# Patient Record
Sex: FEMALE | Race: WHITE | Employment: OTHER | URBAN - METROPOLITAN AREA
[De-identification: names, ages, dates, MRNs, and addresses within clinical notes are randomized per-mention and may not be internally consistent; named-entity substitution may affect disease eponyms.]

---

## 2019-07-10 ENCOUNTER — OFFICE VISIT (OUTPATIENT)
Dept: FAMILY MEDICINE CLINIC | Age: 34
End: 2019-07-10
Payer: COMMERCIAL

## 2019-07-10 VITALS
HEART RATE: 76 BPM | SYSTOLIC BLOOD PRESSURE: 110 MMHG | WEIGHT: 118 LBS | BODY MASS INDEX: 18.48 KG/M2 | DIASTOLIC BLOOD PRESSURE: 80 MMHG

## 2019-07-10 DIAGNOSIS — F43.22 ADJUSTMENT DISORDER WITH ANXIOUS MOOD: Primary | ICD-10-CM

## 2019-07-10 PROCEDURE — 99213 OFFICE O/P EST LOW 20 MIN: CPT | Performed by: FAMILY MEDICINE

## 2019-07-10 PROCEDURE — G8427 DOCREV CUR MEDS BY ELIG CLIN: HCPCS | Performed by: FAMILY MEDICINE

## 2019-07-10 PROCEDURE — 4004F PT TOBACCO SCREEN RCVD TLK: CPT | Performed by: FAMILY MEDICINE

## 2019-07-10 PROCEDURE — G8419 CALC BMI OUT NRM PARAM NOF/U: HCPCS | Performed by: FAMILY MEDICINE

## 2020-01-22 ENCOUNTER — OFFICE VISIT (OUTPATIENT)
Dept: PRIMARY CARE CLINIC | Age: 35
End: 2020-01-22
Payer: COMMERCIAL

## 2020-01-22 LAB
BILIRUBIN, POC: NORMAL
BLOOD URINE, POC: NORMAL
CLARITY, POC: CLEAR
COLOR, POC: YELLOW
GLUCOSE URINE, POC: NORMAL
KETONES, POC: NORMAL
LEUKOCYTE EST, POC: NORMAL
NITRITE, POC: NORMAL
PH, POC: 6
PROTEIN, POC: NORMAL
SPECIFIC GRAVITY, POC: 1.02
UROBILINOGEN, POC: 0.2

## 2020-01-22 PROCEDURE — 93000 ELECTROCARDIOGRAM COMPLETE: CPT | Performed by: FAMILY MEDICINE

## 2020-01-22 PROCEDURE — 4004F PT TOBACCO SCREEN RCVD TLK: CPT | Performed by: FAMILY MEDICINE

## 2020-01-22 PROCEDURE — G8484 FLU IMMUNIZE NO ADMIN: HCPCS | Performed by: FAMILY MEDICINE

## 2020-01-22 PROCEDURE — 81002 URINALYSIS NONAUTO W/O SCOPE: CPT | Performed by: FAMILY MEDICINE

## 2020-01-22 PROCEDURE — 99214 OFFICE O/P EST MOD 30 MIN: CPT | Performed by: FAMILY MEDICINE

## 2020-01-22 PROCEDURE — G8419 CALC BMI OUT NRM PARAM NOF/U: HCPCS | Performed by: FAMILY MEDICINE

## 2020-01-22 PROCEDURE — G8428 CUR MEDS NOT DOCUMENT: HCPCS | Performed by: FAMILY MEDICINE

## 2020-01-22 NOTE — PROGRESS NOTES
Subjective:      Patient ID: Mckayla Mccracken is a 29 y.o. female. HPI11/23 - had crampy RLQ abd pain after eating some lettuce that had been recalled for E.coli. + loose stool, no BRBPR, + N and anorexia. No fever, chills. Later felt some\"mass\" in the RLQ, took laxatives and eventually passed stool. Pain now is random, in RUQ or RLQ. Will have up to 10 episodes/ day, brief. Deep inhalation will cause more pain    Drinks 2 cups coffee per day  Review of Systems    Objective:   Physical Exam  Constitutional:       Appearance: Normal appearance. HENT:      Head: Normocephalic and atraumatic. Mouth/Throat:      Mouth: Mucous membranes are moist.      Dentition: Normal dentition. Pharynx: Oropharynx is clear. Neck:      Musculoskeletal: Normal range of motion. Thyroid: No thyroid mass or thyromegaly. Vascular: No carotid bruit. Cardiovascular:      Rate and Rhythm: Normal rate. Rhythm irregular. Pulmonary:      Effort: Pulmonary effort is normal.      Breath sounds: Normal breath sounds. Abdominal:      General: Abdomen is flat. Bowel sounds are normal.      Palpations: Abdomen is soft. There is no hepatomegaly or splenomegaly. Tenderness: There is tenderness in the right upper quadrant and right lower quadrant. There is rebound. There is no right CVA tenderness or left CVA tenderness. Positive signs include McBurney's sign. Lymphadenopathy:      Cervical: No cervical adenopathy. Neurological:      Mental Status: She is alert. EKG shows ventricular couplets    Assessment:      Right lower quadrant and right upper quadrant abdominal pain.   Ovarian cyst rupture and/or lymphadenitis and/or smoldering appendicitis and/or biliary colic without the gallbladder      Plan:    Ct abd and pelvis with contrast  Holter Monitor  CBC, Renal , ESR and CRP  Stop caffeine        Michael Emerson MD

## 2020-01-27 DIAGNOSIS — I49.9 IRREGULAR HEART BEATS: ICD-10-CM

## 2020-01-27 DIAGNOSIS — R10.11 RUQ ABDOMINAL PAIN: ICD-10-CM

## 2020-01-27 DIAGNOSIS — R10.31 RLQ ABDOMINAL PAIN: ICD-10-CM

## 2020-01-27 DIAGNOSIS — I49.3 FREQUENT UNIFOCAL PVCS: ICD-10-CM

## 2020-01-27 LAB
ANION GAP SERPL CALCULATED.3IONS-SCNC: 15 MMOL/L (ref 3–16)
BASOPHILS ABSOLUTE: 0.1 K/UL (ref 0–0.2)
BASOPHILS RELATIVE PERCENT: 1.3 %
BUN BLDV-MCNC: 9 MG/DL (ref 7–20)
C-REACTIVE PROTEIN: <0.3 MG/L (ref 0–5.1)
CALCIUM SERPL-MCNC: 9.2 MG/DL (ref 8.3–10.6)
CHLORIDE BLD-SCNC: 101 MMOL/L (ref 99–110)
CO2: 23 MMOL/L (ref 21–32)
CREAT SERPL-MCNC: 0.6 MG/DL (ref 0.6–1.1)
EOSINOPHILS ABSOLUTE: 0.5 K/UL (ref 0–0.6)
EOSINOPHILS RELATIVE PERCENT: 9.5 %
GFR AFRICAN AMERICAN: >60
GFR NON-AFRICAN AMERICAN: >60
GLUCOSE BLD-MCNC: 99 MG/DL (ref 70–99)
HCT VFR BLD CALC: 39.2 % (ref 36–48)
HEMOGLOBIN: 13.5 G/DL (ref 12–16)
LYMPHOCYTES ABSOLUTE: 1.3 K/UL (ref 1–5.1)
LYMPHOCYTES RELATIVE PERCENT: 27.7 %
MCH RBC QN AUTO: 33.9 PG (ref 26–34)
MCHC RBC AUTO-ENTMCNC: 34.4 G/DL (ref 31–36)
MCV RBC AUTO: 98.7 FL (ref 80–100)
MONOCYTES ABSOLUTE: 0.4 K/UL (ref 0–1.3)
MONOCYTES RELATIVE PERCENT: 8.6 %
NEUTROPHILS ABSOLUTE: 2.6 K/UL (ref 1.7–7.7)
NEUTROPHILS RELATIVE PERCENT: 52.9 %
PDW BLD-RTO: 12.7 % (ref 12.4–15.4)
PLATELET # BLD: 200 K/UL (ref 135–450)
PMV BLD AUTO: 10.1 FL (ref 5–10.5)
POTASSIUM SERPL-SCNC: 4.2 MMOL/L (ref 3.5–5.1)
RBC # BLD: 3.97 M/UL (ref 4–5.2)
SEDIMENTATION RATE, ERYTHROCYTE: 6 MM/HR (ref 0–20)
SODIUM BLD-SCNC: 139 MMOL/L (ref 136–145)
WBC # BLD: 4.8 K/UL (ref 4–11)

## 2020-02-06 ENCOUNTER — HOSPITAL ENCOUNTER (OUTPATIENT)
Dept: NEUROLOGY | Age: 35
Discharge: HOME | End: 2020-02-06
Payer: COMMERCIAL

## 2020-02-06 ENCOUNTER — HOSPITAL ENCOUNTER (OUTPATIENT)
Dept: CT IMAGING | Age: 35
Discharge: HOME | End: 2020-02-06
Payer: COMMERCIAL

## 2020-02-06 PROCEDURE — 93225 XTRNL ECG REC<48 HRS REC: CPT

## 2020-02-06 PROCEDURE — 6360000004 HC RX CONTRAST MEDICATION: Performed by: FAMILY MEDICINE

## 2020-02-06 PROCEDURE — 74177 CT ABD & PELVIS W/CONTRAST: CPT

## 2020-02-06 PROCEDURE — 93226 XTRNL ECG REC<48 HR SCAN A/R: CPT

## 2020-02-06 RX ADMIN — IOPAMIDOL 75 ML: 755 INJECTION, SOLUTION INTRAVENOUS at 12:57

## 2020-02-06 RX ADMIN — IOHEXOL 50 ML: 240 INJECTION, SOLUTION INTRATHECAL; INTRAVASCULAR; INTRAVENOUS; ORAL at 12:57

## 2020-02-10 LAB
ACQUISITION DURATION: NORMAL S
AVERAGE HEART RATE: 72 BPM
EKG DIAGNOSIS: NORMAL
FASTEST SUPRAVENTRICULAR RATE: 205 BPM
FASTEST VENTRICULAR RATE: 180 BPM
HOLTER MAX HEART RATE: 119 BPM
HOOKUP DATE: NORMAL
HOOKUP TIME: NORMAL
LONGEST SUPRAVENTRICULAR RATE: 156 BPM
LONGEST VE RUN RATE: 153 BPM
MAX HEART RATE TIME/DATE: NORMAL
MIN HEART RATE TIME/DATE: NORMAL
MIN HEART RATE: 47 BPM
NUMBER OF FASTEST SUPRAVENTRICULAR BEATS: 3
NUMBER OF FASTEST VENTRICULAR BEATS: 3
NUMBER OF LONGEST SUPRAVENTRICULAR BEATS: 7
NUMBER OF LONGEST VENTRICULAR BEATS: 4
NUMBER OF QRS COMPLEXES: NORMAL
NUMBER OF SUPRAVENTRICULAR BEATS IN RUNS: 66
NUMBER OF SUPRAVENTRICULAR COUPLETS: 24
NUMBER OF SUPRAVENTRICULAR ECTOPICS: 403
NUMBER OF SUPRAVENTRICULAR ISOLATED BEATS: 288
NUMBER OF SUPRAVENTRICULAR RUNS: 17
NUMBER OF VENTRICULAR BEATS IN RUNS: 620
NUMBER OF VENTRICULAR BIGEMINAL CYCLES: 1129
NUMBER OF VENTRICULAR COUPLETS: 1701
NUMBER OF VENTRICULAR ECTOPICS: 7393
NUMBER OF VENTRICULAR ISOLATED BEATS: 3370
NUMBER OF VENTRICULAR RUNS: 206

## 2020-02-11 ENCOUNTER — TELEPHONE (OUTPATIENT)
Dept: PRIMARY CARE CLINIC | Age: 35
End: 2020-02-11

## 2020-02-11 NOTE — TELEPHONE ENCOUNTER
I hope you are able to give up caffeine. Despite that you had about 7300 of the irregular type of beats that do not pump any blood. About half of those were isolated by themselves but others were in couplets, triplets, and there was one 4 beat run that occurred at 2:51 AM on the Friday morning. This is important because during these premature ventricular contractions no blood is being pumped to your brain or anywhere else. Also, if a 24-hour window was able to capture a 4 beat run its possible that your heart may be having even longer episodes that would only be seen with a longer Holter monitor. Because of these irregular beats I would like to refer you to a cardiologist, I am concerned about how frequent they are and the fact that they are grouped together. Dr. Ricardo Landa comes to this office and can see you here. You can reach him at   [phone].

## 2020-02-13 NOTE — PROGRESS NOTES
Provider   Multiple Vitamins-Minerals (HAIR SKIN AND NAILS FORMULA PO) Take by mouth   Yes Historical Provider, MD   Aspirin-Acetaminophen-Caffeine (EXCEDRIN MIGRAINE PO) Take 1-2 tablets by mouth as needed. Yes Historical Provider, MD        Review of Systems   Constitutional: Negative for activity change, appetite change, diaphoresis, fatigue, fever and unexpected weight change. HENT: Negative for congestion, facial swelling, mouth sores and nosebleeds. Eyes: Negative for discharge and visual disturbance. Respiratory: Negative for cough, chest tightness, shortness of breath and wheezing. Cardiovascular: Negative for chest pain, palpitations and leg swelling. Gastrointestinal: Negative for abdominal distention, abdominal pain, blood in stool and vomiting. Endocrine: Negative for cold intolerance, heat intolerance and polyuria. Genitourinary: Negative for difficulty urinating, dysuria, frequency and hematuria. Musculoskeletal: Negative for back pain, joint swelling, myalgias and neck pain. Skin: Negative for color change, pallor and rash. Allergic/Immunologic: Negative for immunocompromised state. Neurological: Negative for dizziness, syncope, weakness, light-headedness, numbness and headaches. Hematological: Negative for adenopathy. Does not bruise/bleed easily. Psychiatric/Behavioral: Negative for behavioral problems, confusion, decreased concentration and suicidal ideas. The patient is not nervous/anxious.         Vitals:    02/18/20 1448   BP: 104/60   Pulse: 58    Weight: 123 lb 3.2 oz (55.9 kg)       Vitals:    02/18/20 1448   BP: 104/60   Pulse: 58   Weight: 123 lb 3.2 oz (55.9 kg)       BP Readings from Last 3 Encounters:   02/18/20 104/60   07/10/19 110/80   11/25/16 112/72       Wt Readings from Last 3 Encounters:   02/18/20 123 lb 3.2 oz (55.9 kg)   07/10/19 118 lb (53.5 kg)   11/25/16 123 lb (55.8 kg)       Physical Exam  Constitutional:       General: She is not in acute to not utilize these agents. All questions and concerns were addressed to the patient/family. Alternatives to my treatment were discussed. The note was completed using EMR. Every effort wasmade to ensure accuracy; however, inadvertent computerized transcription errors may be present. Thank you for allowing me to participate in thecare or [facility]  Antoine Ambriz MD, [facility]

## 2020-02-18 ENCOUNTER — OFFICE VISIT (OUTPATIENT)
Dept: CARDIOLOGY CLINIC | Age: 35
End: 2020-02-18
Payer: COMMERCIAL

## 2020-02-18 VITALS
WEIGHT: 123.2 LBS | BODY MASS INDEX: 19.3 KG/M2 | HEART RATE: 58 BPM | DIASTOLIC BLOOD PRESSURE: 60 MMHG | SYSTOLIC BLOOD PRESSURE: 104 MMHG

## 2020-02-18 PROBLEM — R94.31 ABNORMAL HOLTER EXAM: Status: ACTIVE | Noted: 2020-02-18

## 2020-02-18 PROCEDURE — 99204 OFFICE O/P NEW MOD 45 MIN: CPT | Performed by: INTERNAL MEDICINE

## 2020-02-18 PROCEDURE — G8484 FLU IMMUNIZE NO ADMIN: HCPCS | Performed by: INTERNAL MEDICINE

## 2020-02-18 PROCEDURE — G8420 CALC BMI NORM PARAMETERS: HCPCS | Performed by: INTERNAL MEDICINE

## 2020-02-18 PROCEDURE — 1036F TOBACCO NON-USER: CPT | Performed by: INTERNAL MEDICINE

## 2020-02-18 PROCEDURE — G8427 DOCREV CUR MEDS BY ELIG CLIN: HCPCS | Performed by: INTERNAL MEDICINE

## 2020-02-18 ASSESSMENT — ENCOUNTER SYMPTOMS
ABDOMINAL PAIN: 0
WHEEZING: 0
SHORTNESS OF BREATH: 0
ABDOMINAL DISTENTION: 0
FACIAL SWELLING: 0
CHEST TIGHTNESS: 0
VOMITING: 0
BLOOD IN STOOL: 0
COUGH: 0
BACK PAIN: 0
COLOR CHANGE: 0
EYE DISCHARGE: 0

## 2020-02-18 NOTE — ASSESSMENT & PLAN NOTE
Personally reviewed her EKG tracings and Holter tracings. (It also showed EP tracings)  Seems that the arrhythmia 8 been generated in her RV free wall, and it raises a suspicion for ARVD  Plan for cardiac MRI to further assess ARVD. If no evidence of ARVD will start beta-blocker to decrease PVC burden perform a repeat Holter monitor. If her PVC burden does not decrease she will likely need to be referred to EP for ablation.

## 2020-02-19 ENCOUNTER — TELEPHONE (OUTPATIENT)
Dept: CARDIOLOGY CLINIC | Age: 35
End: 2020-02-19

## 2020-02-19 NOTE — TELEPHONE ENCOUNTER
Spoke with patient and let her know to be at [facility] at 6:15 on 2/21/20. Reviewed instructions. Patient verbalized understanding.

## 2020-02-24 ENCOUNTER — TELEPHONE (OUTPATIENT)
Dept: CARDIOLOGY | Age: 35
End: 2020-02-24

## 2020-02-28 ASSESSMENT — ENCOUNTER SYMPTOMS
BACK PAIN: 0
VOMITING: 0
COLOR CHANGE: 0
ABDOMINAL DISTENTION: 0
ABDOMINAL PAIN: 0
FACIAL SWELLING: 0
CHEST TIGHTNESS: 0
EYE DISCHARGE: 0
WHEEZING: 0
COUGH: 0
BLOOD IN STOOL: 0
SHORTNESS OF BREATH: 0

## 2020-02-28 NOTE — PROGRESS NOTES
Provider, MD   Aspirin-Acetaminophen-Caffeine (EXCEDRIN MIGRAINE PO) Take 1-2 tablets by mouth as needed. Yes Historical Provider, MD        Review of Systems   Constitutional: Negative for activity change, appetite change, diaphoresis, fatigue, fever and unexpected weight change. HENT: Negative for congestion, facial swelling, mouth sores and nosebleeds. Eyes: Negative for discharge and visual disturbance. Respiratory: Negative for cough, chest tightness, shortness of breath and wheezing. Cardiovascular: Positive for palpitations. Negative for chest pain and leg swelling. Gastrointestinal: Negative for abdominal distention, abdominal pain, blood in stool and vomiting. Endocrine: Negative for cold intolerance, heat intolerance and polyuria. Genitourinary: Negative for difficulty urinating, dysuria, frequency and hematuria. Musculoskeletal: Negative for back pain, joint swelling, myalgias and neck pain. Skin: Negative for color change, pallor and rash. Allergic/Immunologic: Negative for immunocompromised state. Neurological: Negative for dizziness, syncope, weakness, light-headedness, numbness and headaches. Hematological: Negative for adenopathy. Does not bruise/bleed easily. Psychiatric/Behavioral: Negative for behavioral problems, confusion, decreased concentration and suicidal ideas. The patient is not nervous/anxious. Vitals:    03/03/20 1534   BP: 110/60   Pulse: 68   SpO2: 99%    Weight: 126 lb 9.6 oz (57.4 kg)       Vitals:    03/03/20 1534   BP: 110/60   Pulse: 68   SpO2: 99%   Weight: 126 lb 9.6 oz (57.4 kg)   Height: 5' 7\" (1.702 m)       BP Readings from Last 3 Encounters:   03/03/20 110/60   02/18/20 104/60   07/10/19 110/80       Wt Readings from Last 3 Encounters:   03/03/20 126 lb 9.6 oz (57.4 kg)   02/18/20 123 lb 3.2 oz (55.9 kg)   07/10/19 118 lb (53.5 kg)       Physical Exam  Constitutional:       General: She is not in acute distress.      Appearance: She is well-developed. She is not diaphoretic. HENT:      Head: Normocephalic and atraumatic. Eyes:      Pupils: Pupils are equal, round, and reactive to light. Neck:      Musculoskeletal: Normal range of motion. Thyroid: No thyromegaly. Vascular: No JVD. Cardiovascular:      Rate and Rhythm: Normal rate and regular rhythm. Chest Wall: PMI is not displaced. Heart sounds: Normal heart sounds, S1 normal and S2 normal. No murmur. No friction rub. No gallop. Pulmonary:      Effort: Pulmonary effort is normal. No respiratory distress. Breath sounds: Normal breath sounds. No stridor. No wheezing or rales. Chest:      Chest wall: No tenderness. Abdominal:      General: Bowel sounds are normal. There is no distension. Palpations: Abdomen is soft. Tenderness: There is no abdominal tenderness. There is no guarding or rebound. Musculoskeletal: Normal range of motion. General: No tenderness. Lymphadenopathy:      Cervical: No cervical adenopathy. Skin:     General: Skin is warm and dry. Findings: No erythema or rash. Neurological:      Mental Status: She is alert and oriented to person, place, and time. Coordination: Coordination normal.   Psychiatric:         Behavior: Behavior normal.         Thought Content:  Thought content normal.         Judgment: Judgment normal.         Labs:       Lab Results   Component Value Date    WBC 4.8 01/27/2020    HGB 13.5 01/27/2020    HCT 39.2 01/27/2020    MCV 98.7 01/27/2020     01/27/2020     Lab Results   Component Value Date     01/27/2020    K 4.2 01/27/2020     01/27/2020    CO2 23 01/27/2020    BUN 9 01/27/2020    CREATININE 0.6 01/27/2020    GLUCOSE 99 01/27/2020    CALCIUM 9.2 01/27/2020    PROT 8.1 01/05/2012    LABALBU 4.7 01/05/2012    BILITOT 4.4 (H) 01/05/2012    ALKPHOS 178 (H) 01/05/2012     (H) 01/05/2012     (H) 01/05/2012    LABGLOM >60 01/27/2020    GFRAA >60 01/27/2020 No results found for: CHOL  No results found for: TRIG  No results found for: HDL  No results found for: LDLCHOLESTEROL, LDLCALC  No results found for: LABVLDL, VLDL  No results found for: CHOLHDLRATIO    No results found for: INR, PROTIME    The ASCVD Risk score ([place] Holland, et al., 2013) failed to calculate for the following reasons: The 2013 ASCVD risk score is only valid for ages 36 to 78      Imaging:       Last ECG (if available):  Normal sinus rhythm, couplets. Last Monitor/Holter: 2/6/20  The rhythm was sinus. Average [place] interval 0.16 average QRS duration 0.08. Average daily heart rate 72 ranging from 47 to 119 bpm.2. Occasional premature supraventricular ectopic beats total 403 consistingof 288 isolated PACs, 24 atrial pairs and 17 atrial runs. The longest run was 7 beats HR - 156 bpm at 14:26:20. The fastest run was 3 beats HR - 205 bpmat 23:03:55.3. Very Frequent premature ventricular ectopic beats total 7393 consistingof 3370 isolated PVCs, 1701 ventricular couplets, 205 ventricular tripletsand a ventricular run 4 beats HR - 153 bpm at 02:51:46.4. No symptoms noted. Last Stress (if available):    Last Cath (if available):    Last TTE/PRINCESS(if available):    Last CMR  (if available): 2/21/20  The right ventricle is normal in size with preserved systolic function. Findings do not support Arrhythmogenic RV Cardiomyopathy. There is late gadolinium enhancement of the inferoseptal right ventricular insertion point, a nonspecific finding suggestive of elevated right heart pressure. The left ventricle is moderately dilated. There is low normal systolic function. The left atrium is at the upper limit of normal in size.  There is mild mitral regurgitation. Frequently ectopy is noted. Assessment / Plan:     PVC (premature ventricular contraction)  Previously had abnormal Holter with frequent PVCs including some small runs of nonsustained VT.   Cardiac MRI within normal limits, no findings

## 2020-03-03 ENCOUNTER — OFFICE VISIT (OUTPATIENT)
Dept: CARDIOLOGY CLINIC | Age: 35
End: 2020-03-03
Payer: COMMERCIAL

## 2020-03-03 ENCOUNTER — NURSE ONLY (OUTPATIENT)
Dept: CARDIOLOGY CLINIC | Age: 35
End: 2020-03-03

## 2020-03-03 VITALS
BODY MASS INDEX: 19.87 KG/M2 | SYSTOLIC BLOOD PRESSURE: 110 MMHG | WEIGHT: 126.6 LBS | HEART RATE: 68 BPM | OXYGEN SATURATION: 99 % | DIASTOLIC BLOOD PRESSURE: 60 MMHG | HEIGHT: 67 IN

## 2020-03-03 PROCEDURE — G8420 CALC BMI NORM PARAMETERS: HCPCS | Performed by: INTERNAL MEDICINE

## 2020-03-03 PROCEDURE — 1036F TOBACCO NON-USER: CPT | Performed by: INTERNAL MEDICINE

## 2020-03-03 PROCEDURE — G8484 FLU IMMUNIZE NO ADMIN: HCPCS | Performed by: INTERNAL MEDICINE

## 2020-03-03 PROCEDURE — 99214 OFFICE O/P EST MOD 30 MIN: CPT | Performed by: INTERNAL MEDICINE

## 2020-03-03 PROCEDURE — G8427 DOCREV CUR MEDS BY ELIG CLIN: HCPCS | Performed by: INTERNAL MEDICINE

## 2020-03-03 PROCEDURE — 0296T PR EXT ECG > 48HR TO 21 DAY RCRD W/CONECT INTL RCRD: CPT | Performed by: INTERNAL MEDICINE

## 2020-03-04 PROBLEM — I49.3 PVC (PREMATURE VENTRICULAR CONTRACTION): Status: ACTIVE | Noted: 2020-03-04

## 2020-03-17 PROCEDURE — 0298T PR EXT ECG > 48HR TO 21 DAY REVIEW AND INTERPRETATN: CPT | Performed by: INTERNAL MEDICINE

## 2020-03-19 LAB
CLINICIAN PROVIDED ICD10: NORMAL
COMMENT: NORMAL
HPV DNA: NEGATIVE
Lab: NORMAL
PAP WITH REFLEX HPV: NORMAL
PERFORMED BY:: NORMAL
SPECIMEN ADEQUACY:: NORMAL

## 2020-05-05 ENCOUNTER — OFFICE VISIT (OUTPATIENT)
Dept: CARDIOLOGY CLINIC | Age: 35
End: 2020-05-05
Payer: COMMERCIAL

## 2020-05-05 VITALS
TEMPERATURE: 97.6 F | SYSTOLIC BLOOD PRESSURE: 102 MMHG | HEIGHT: 67 IN | BODY MASS INDEX: 20.09 KG/M2 | WEIGHT: 128 LBS | HEART RATE: 63 BPM | DIASTOLIC BLOOD PRESSURE: 70 MMHG

## 2020-05-05 PROCEDURE — 93000 ELECTROCARDIOGRAM COMPLETE: CPT | Performed by: INTERNAL MEDICINE

## 2020-05-05 PROCEDURE — 99204 OFFICE O/P NEW MOD 45 MIN: CPT | Performed by: INTERNAL MEDICINE

## 2020-05-05 PROCEDURE — 1036F TOBACCO NON-USER: CPT | Performed by: INTERNAL MEDICINE

## 2020-05-05 PROCEDURE — G8427 DOCREV CUR MEDS BY ELIG CLIN: HCPCS | Performed by: INTERNAL MEDICINE

## 2020-05-05 PROCEDURE — G8420 CALC BMI NORM PARAMETERS: HCPCS | Performed by: INTERNAL MEDICINE

## 2020-05-05 RX ORDER — IBUPROFEN 800 MG/1
800 TABLET ORAL EVERY 6 HOURS PRN
COMMUNITY

## 2020-05-05 RX ORDER — FLECAINIDE ACETATE 50 MG/1
50 TABLET ORAL 2 TIMES DAILY
Qty: 60 TABLET | Refills: 5 | Status: SHIPPED | OUTPATIENT
Start: 2020-05-05 | End: 2020-10-26

## 2020-05-08 ENCOUNTER — NURSE ONLY (OUTPATIENT)
Dept: CARDIOLOGY CLINIC | Age: 35
End: 2020-05-08
Payer: COMMERCIAL

## 2020-05-08 PROCEDURE — 93000 ELECTROCARDIOGRAM COMPLETE: CPT | Performed by: INTERNAL MEDICINE

## 2020-07-07 NOTE — PROGRESS NOTES
[address]   Cardiac Consultation    Referring Provider:  Blanca Puentes MD     Chief Complaint   Patient presents with    Other     PVC's     HPI:  Pauline Quiroz is a 29 y.o. female referred by Dr. Brian Ramirez for PVC's. PMH of frequent PVC's. 24 hr Holter (2/6/20) showed SR with average HR 72 () with PVC burden of 7.3%. She was started on Metoprolol on 2/24/20. 48 hr Holter (3/3-3/5/20) showed SR with average HR 65 ( bpm) with PVC burden of 8%. Monitor also showed brief NSVT which appears to induce brief PAT. Cardiac MRI (2/21/20) showed no evidence of ARVC but slight LV dysfunction ( LVEF EF 52%). She states that she has passed out several times, but none recently. Once when she was 6 yrs old playing soccer and the other times were during pregnancy. Flecainide was started in 5/2020 and weaned off BB. Currently on flecainide 50 mg BID. She is here today for 2 month f/u since starting the flecainide. She doesn't feel much different on the flecainide, but she does notice that her pulse is regular when she takes her BP. She states her SBP at home runs about  mmHg and has run low her whole life. She feels great. Denies complaints of palpitations, dizziness, CP, SOB, orthopnea, presyncope, or syncope. 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 She tries to exercise when she can and reports good exercise tolerance. Past Medical History:   has a past medical history of Abnormal Pap smear, Anemia, and Migraines. Surgical History:   has a past surgical history that includes [place] tooth extraction; Cholecystectomy, laparoscopic (1/12/12); and Hysterectomy, vaginal (05/2015). Social History:   reports that she quit smoking about 9 years ago. Her smoking use included cigarettes. She smoked 0.25 packs per day. She has never used smokeless tobacco. She reports current alcohol use.  She reports that she does not use drugs. Family History:  family history includes Arthritis in her father; Asthma in her brother; Cancer in her maternal grandmother; Diabetes in her maternal grandmother; High Cholesterol in her father; [place] / [place] in her mother; Stroke in her maternal grandmother. Home Medications:  Outpatient Encounter Medications as of 7/8/2020   Medication Sig Dispense Refill    ibuprofen (ADVIL;MOTRIN) 800 MG tablet Take 800 mg by mouth every 6 hours as needed for Pain      flecainide (TAMBOCOR) 50 MG tablet Take 1 tablet by mouth 2 times daily 60 tablet 5    Aspirin-Acetaminophen-Caffeine (EXCEDRIN MIGRAINE PO) Take 1-2 tablets by mouth as needed Indications: takes with a coke       [DISCONTINUED] metoprolol tartrate (LOPRESSOR) 25 MG tablet TAKE 1 TABLET BY MOUTH TWO TIMES A DAY  (Patient not taking: Reported on 7/8/2020) 60 tablet 0     No facility-administered encounter medications on file as of 7/8/2020. Allergies:  Adhesive tape     Review of Systems   Constitutional: Negative. HENT: Negative. Eyes: Negative. Respiratory: Negative. Cardiovascular: Negative. Gastrointestinal: Negative. Genitourinary: Negative. Musculoskeletal: Negative. Skin: Negative. Neurological: Negative. Hematological: Negative. Psychiatric/Behavioral: Negative. /60   Pulse 63   Temp 98.1 °F (36.7 °C)   Wt 124 lb 9.6 oz (56.5 kg)   LMP 12/29/2011   BMI 19.52 kg/m²       Objective:  Physical Exam   Constitutional: She is oriented to person, place, and time. She appears well-developed and well-nourished. HENT:   Head: Normocephalic and atraumatic. Eyes: Pupils are equal, round, and reactive to light. Neck: Normal range of motion. Cardiovascular: Normal rate, regular rhythm and normal heart sounds. Pulmonary/Chest: Effort normal and breath sounds normal.   Abdominal: Soft. No tenderness. Musculoskeletal: Normal range of motion. She exhibits no edema. Neurological: She is alert and oriented to person, place, and time. Skin: Skin is warm and dry. Psychiatric: She has a normal mood and affect. EK20  SR 63 with 1 PVC, QRS 97 ms    Cardiac MRI 20  IMPRESSIONS:  The right ventricle is normal in size with preserved systolic function. Findings do not support Arrhythmogenic RV Cardiomyopathy. There is late gadolinium enhancement of the inferoseptal right ventricular insertion point, a nonspecific finding suggestive of elevated right heart pressure. The left ventricle is moderately dilated. There is low normal systolic function. The left atrium is at the upper limit of normal in size.  There is mild mitral regurgitation. Frequently ectopy is noted. Assessment:  1. PVC's- 24 hr Holter in 2020 with PVC burden of 7.3%. 14 day Holter (3/2020) showed 8% PVC after being on metoprolol for 1 week. No difference in symptoms since being on metoprolol. Complains of occasional heart racing. LVEF 52% by MRI. Discussed options of changing medicine vs catheter ablation for treatment of PVC's. She is not interested in an invasive procedure at this time, but may consider it. Her PVCs are of outflow tract origin, probably LVOT. Feels good on flecainide. Plan:  1. Continue flecainide 50 mg BID  2. No limitation in physical activity  3. Follow up in 6 months    IChasidy RN, am scribing for and in the presence of Dr. Jocelyne Staton. 20 4:01 PM  Chasidy Orozco RN    I, Dr. Jocelyne Staton, personally performed the services described in this documentation as scribed by Chasidy Orozco RN in my presence, and it is both accurate and complete.       Jocelyne Staton M.D.

## 2020-07-08 ENCOUNTER — OFFICE VISIT (OUTPATIENT)
Dept: CARDIOLOGY CLINIC | Age: 35
End: 2020-07-08
Payer: COMMERCIAL

## 2020-07-08 VITALS
WEIGHT: 124.6 LBS | SYSTOLIC BLOOD PRESSURE: 103 MMHG | TEMPERATURE: 98.1 F | HEART RATE: 63 BPM | DIASTOLIC BLOOD PRESSURE: 60 MMHG | BODY MASS INDEX: 19.52 KG/M2

## 2020-07-08 PROCEDURE — G8427 DOCREV CUR MEDS BY ELIG CLIN: HCPCS | Performed by: INTERNAL MEDICINE

## 2020-07-08 PROCEDURE — 99214 OFFICE O/P EST MOD 30 MIN: CPT | Performed by: INTERNAL MEDICINE

## 2020-07-08 PROCEDURE — 93000 ELECTROCARDIOGRAM COMPLETE: CPT | Performed by: INTERNAL MEDICINE

## 2020-07-08 PROCEDURE — G8420 CALC BMI NORM PARAMETERS: HCPCS | Performed by: INTERNAL MEDICINE

## 2020-07-08 PROCEDURE — 1036F TOBACCO NON-USER: CPT | Performed by: INTERNAL MEDICINE

## 2020-10-26 RX ORDER — FLECAINIDE ACETATE 50 MG/1
TABLET ORAL
Qty: 60 TABLET | Refills: 5 | Status: SHIPPED | OUTPATIENT
Start: 2020-10-26 | End: 2021-04-29 | Stop reason: SDUPTHER

## 2020-10-26 NOTE — TELEPHONE ENCOUNTER
Requested Prescriptions     Pending Prescriptions Disp Refills    flecainide (TAMBOCOR) 50 MG tablet [Pharmacy Med Name: Flecainide Acetate Oral Tablet 50 MG] 60 tablet 0     Sig: TAKE 1 TABLET BY MOUTH TWO TIMES A DAY          Number: 60    Refills: 5    Last Office Visit: 7/8/2020     Next Office Visit: 1/18/2021     Last Refill: 05/05/2020    Last Labs: 01/27/2020 BMP/CBC/sedimentation rate

## 2021-01-18 ENCOUNTER — OFFICE VISIT (OUTPATIENT)
Dept: CARDIOLOGY CLINIC | Age: 36
End: 2021-01-18
Payer: COMMERCIAL

## 2021-01-18 VITALS
HEIGHT: 67 IN | TEMPERATURE: 98.6 F | OXYGEN SATURATION: 98 % | DIASTOLIC BLOOD PRESSURE: 72 MMHG | WEIGHT: 124.4 LBS | HEART RATE: 69 BPM | BODY MASS INDEX: 19.53 KG/M2 | SYSTOLIC BLOOD PRESSURE: 110 MMHG

## 2021-01-18 DIAGNOSIS — R00.2 PALPITATIONS: ICD-10-CM

## 2021-01-18 DIAGNOSIS — R55 SYNCOPE AND COLLAPSE: ICD-10-CM

## 2021-01-18 DIAGNOSIS — I49.3 PVC (PREMATURE VENTRICULAR CONTRACTION): Primary | ICD-10-CM

## 2021-01-18 PROCEDURE — 99213 OFFICE O/P EST LOW 20 MIN: CPT | Performed by: NURSE PRACTITIONER

## 2021-01-18 PROCEDURE — 93000 ELECTROCARDIOGRAM COMPLETE: CPT | Performed by: NURSE PRACTITIONER

## 2021-01-18 PROCEDURE — G8420 CALC BMI NORM PARAMETERS: HCPCS | Performed by: NURSE PRACTITIONER

## 2021-01-18 PROCEDURE — G8484 FLU IMMUNIZE NO ADMIN: HCPCS | Performed by: NURSE PRACTITIONER

## 2021-01-18 PROCEDURE — G8427 DOCREV CUR MEDS BY ELIG CLIN: HCPCS | Performed by: NURSE PRACTITIONER

## 2021-01-18 PROCEDURE — 1036F TOBACCO NON-USER: CPT | Performed by: NURSE PRACTITIONER

## 2021-01-18 NOTE — PROGRESS NOTES
[facility]   Electrophysiology  Office Visit  Date: 1/18/2021    Chief Complaint   Patient presents with    Palpitations    Loss of Consciousness       Cardiac HX: Dhara Rosales is a 28 y.o. woman with a h/o PVCs and syncope, referred by Dr. Keith Ingram for PVCs, 24-hour Holter monitor (2/6/2020) showed NSR with a min HR 47, average 72, max 119 with a PVC burden of 7.3%, placed on metoprolol 2/24/2020, 48-hour Holter (3/3/2020-3/5/2020) showed NSR with a min HR 48, average 65, max 104 and a PVC burden of 8%, NSVT and PAT. Cardiac MRI (2/21/2020) showed no evidence of ARVC but slight LV dysfunction with an EF of 52%, flecainide started 5/20/2020 and weaned off beta-blocker. Interval History/HPI: Patient is here for follow-up for PVCs and a h/o syncope. She has a longstanding history of PVCs. She was originally placed on metoprolol with no improvement in the amount of PVCs. She was then placed on flecainide with symptomatic improvement. She has not had any episodes of dizziness, lightheadedness or syncope. She denies any chest pain, shortness of breath, PND, orthopnea or lower extremity edema. She is active, stays busy running a  and trying to increase her exercise. Home medications:   Current Outpatient Medications on File Prior to Visit   Medication Sig Dispense Refill    flecainide (TAMBOCOR) 50 MG tablet TAKE 1 TABLET BY MOUTH TWO TIMES A DAY  60 tablet 5    ibuprofen (ADVIL;MOTRIN) 800 MG tablet Take 800 mg by mouth every 6 hours as needed for Pain      Aspirin-Acetaminophen-Caffeine (EXCEDRIN MIGRAINE PO) Take 1-2 tablets by mouth as needed Indications: takes with a coke        No current facility-administered medications on file prior to visit.         Past Medical History:   Diagnosis Date    Abnormal Pap smear     had biopsy negative    Anemia chronic, since childhood    was on iron but stopped taking    Migraines         Past Surgical History:   Procedure Laterality Date  CHOLECYSTECTOMY, LAPAROSCOPIC  1/12/12    LAPAROSCOPIC CHOLECYSTECTOMY WITH INTRAOPERATIVE    HYSTERECTOMY, VAGINAL  05/2015    cervical dysplasia and heavy bleeding    WISDOM TOOTH EXTRACTION         Allergies   Allergen Reactions    Adhesive Tape      Red skin       Social History:  Reviewed. reports that she quit smoking about 10 years ago. Her smoking use included cigarettes. She smoked 0.25 packs per day. She has never used smokeless tobacco. She reports current alcohol use. She reports that she does not use drugs. Family History:  Reviewed. family history includes Arthritis in her father; Asthma in her brother; Cancer in her maternal grandmother; Diabetes in her maternal grandmother; High Cholesterol in her father; [place] / [place] in her mother; Stroke in her maternal grandmother. Review of System:    · Constitutional: No fevers, chills. · Eyes: No visual changes or diplopia. No scleral icterus. · ENT: No Headaches. No mouth sores or sore throat. · Cardiovascular: No for chest pain, No for dyspnea on exertion, No for palpitations or No for loss of consciousness. No cough, hemoptysis, No for pleuritic pain, or phlebitis. · Respiratory: No for cough or wheezing. No hematemesis. · Gastrointestinal: No abdominal pain, blood in stools. · Genitourinary: No dysuria, or hematuria. · Musculoskeletal: No gait disturbance,    · Integumentary: No rash or pruritis. · Neurological: No headache, change in muscle strength, numbness or tingling. · Psychiatric: No anxiety, or depression. · Endocrine: No temperature intolerance. No excessive thirst, fluid intake, or urination. · Hem/Lymph: No abnormal bruising or bleeding, blood clots or swollen lymph nodes. · Allergic/Immunologic: No nasal congestion or hives.     Physical Examination:  Vitals:    01/18/21 1537   BP: 110/72   Pulse: 69   Temp: 98.6 °F (37 °C)   SpO2: 98%         Wt Readings from Last 3 Encounters:   01/18/21 124 lb inferior segments.   There is no evidence of myocardial edema by T2    weighted imaging (65.5 msec). There is no evidence of increased iron deposition within the    myocardium (T2*myocardium = 33 msec; if < 20 msec, suggestive of iron-overloading of the    myocardium).       3. The right ventricular size is normal. Global right ventricular function is normal. There are    no regional wall motion abnormalities of the right ventricular wall. There is late gadolinium    enhancement of the right ventricular insertion points.       4. Left atrial size is at the upper limit of normal. Right atrial size is normal.  The Qp/Qs is    1.0 by phase contrast imaging and is consistent with no evidence of shunt.       5. Velocity-encoded phase contrast Imaging in (2D/4D) was performed to assess valvular    function. Peak velocity at the aortic valve is 1.5 corresponding to a peak gradient of 9.0    mmHg. The calculated aortic regurgitant fraction is 0.0 %. There is mild mitral regurgitation. The calculated mitral regurgitant fraction is 14.21 %. Peak velocity at pulmonary valve is 0.64    corresponding to a peak gradient of 1.63 mmHg. The calculated pulmonic regurgitant fraction is    0.0 %.       6. There is a trivial pericardial effusion.           Problem List:   Patient Active Problem List    Diagnosis Date Noted    PVC (premature ventricular contraction) 03/04/2020    Abnormal Holter exam 02/18/2020    Anemia 12/20/2011    Cholelithiasis 11/29/2011    Abdominal pain, acute, right lower quadrant 11/01/2011    Hematuria 11/01/2011        Assessment:   1. PVC (premature ventricular contraction)    2. Syncope and collapse    3.  Palpitations      Cardiac HX: Letitia Bustamante is a 28 y.o. woman with a h/o PVCs and syncope, referred by Dr. Guerita Kuhn for PVCs, 24-hour Holter monitor (2/6/2020) showed NSR with a min HR 47, average 72, max 119 with a PVC burden of 7.3%, placed on metoprolol 2/24/2020, 48-hour Holter (3/3/2020-3/5/2020) showed NSR with a min HR 48, average 65, max 104 and a PVC burden of 8%, NSVT and PAT. Cardiac MRI (2/21/2020) showed no evidence of ARVC but slight LV dysfunction with an EF of 52%, flecainide started 5/20/2020 and weaned off beta-blocker. PVCs  - No symptoms  - On flecainide 50 mg twice daily - QRS 94 - continue  - Consider RFA if recurrent palps on flecainide  - Consider echo at next OV  - ECG ordered and results personally reviewed     EF of 52%  No known HF  No known CAD  No known AF  No Tobacco use. All questions and concerns were addressed to the patient/family. Alternatives to my treatment were discussed. The note was completed using EMR. Every effort was made to ensure accuracy; however, inadvertent computerized transcription errors may be present. Patient received education regarding their diagnosis, treatment and medications while in the office today.       Ivania Jurado [address]

## 2021-04-29 ENCOUNTER — TELEPHONE (OUTPATIENT)
Dept: CARDIOLOGY CLINIC | Age: 36
End: 2021-04-29

## 2021-04-29 RX ORDER — FLECAINIDE ACETATE 50 MG/1
50 TABLET ORAL DAILY
Qty: 60 TABLET | Refills: 5 | Status: SHIPPED | OUTPATIENT
Start: 2021-04-29 | End: 2021-05-17 | Stop reason: SDUPTHER

## 2021-04-29 NOTE — TELEPHONE ENCOUNTER
[facility] Medication Refills:    Medication: Flecanide    Dosage: 50 mg    Number: 60    Refills: 5    Last Office Visit: 05/05/2020    Next Office Visit: 07/07/2020    Last Refill: 10/26/2020    Last Labs: 01/27/2020 BMP/CBC/ Sedimentation Rate / C-Reactive P. Rx was filled with on 04/29/2021 for  90 tablets to be taken one tablet by mouth daily pt currently takes it one tablet by mouth twice daily she will run out of meds before then.

## 2021-04-29 NOTE — TELEPHONE ENCOUNTER
Requested Prescriptions     Pending Prescriptions Disp Refills    flecainide (TAMBOCOR) 50 MG tablet 90 tablet 3     Sig: Take 1 tablet by mouth daily          Number: 90    Refills: 3    Last Office Visit: 1/18/2021     Next Office Visit: 7/7/2021      *pt only has 2 pills left

## 2021-05-17 RX ORDER — FLECAINIDE ACETATE 50 MG/1
50 TABLET ORAL DAILY
Qty: 60 TABLET | Refills: 5 | Status: CANCELLED | OUTPATIENT
Start: 2021-05-17

## 2021-05-17 RX ORDER — FLECAINIDE ACETATE 50 MG/1
50 TABLET ORAL DAILY
Qty: 180 TABLET | Refills: 3 | Status: SHIPPED | OUTPATIENT
Start: 2021-05-17 | End: 2021-06-01 | Stop reason: SDUPTHER

## 2021-05-17 NOTE — TELEPHONE ENCOUNTER
Requested Prescriptions     Pending Prescriptions Disp Refills    flecainide (TAMBOCOR) 50 MG tablet 180 tablet 3     Sig: Take 1 tablet by mouth daily                Last Office Visit:01/18/2021  Next Office Visit: 07/07/2021

## 2021-05-17 NOTE — TELEPHONE ENCOUNTER
flecainide (TAMBOCOR) 50 MG tablet      Refills:5 ordered [facility] [address] [phone] - F [phone]    Patient still waiting for refill from encounter on  4-29-21 about her medication  because it should be 1 pill 2 x's a day. She is running out .

## 2021-06-01 ENCOUNTER — TELEPHONE (OUTPATIENT)
Dept: CARDIOLOGY CLINIC | Age: 36
End: 2021-06-01

## 2021-06-01 RX ORDER — FLECAINIDE ACETATE 50 MG/1
50 TABLET ORAL 2 TIMES DAILY
Qty: 180 TABLET | Refills: 3 | Status: SHIPPED | OUTPATIENT
Start: 2021-06-01 | End: 2022-08-29 | Stop reason: SDUPTHER

## 2021-06-01 NOTE — TELEPHONE ENCOUNTER
Joleen Ingram from [facility] called to clarify a pt's flecainide. Pt states they need 50 mg BID and Matt's not from 1/18/21 states 50mg BID. But prescription was sent for once daily. [facility] would just like a clarification .  Please advise [phone]

## 2021-11-03 ENCOUNTER — OFFICE VISIT (OUTPATIENT)
Dept: CARDIOLOGY CLINIC | Age: 36
End: 2021-11-03
Payer: COMMERCIAL

## 2021-11-03 VITALS
DIASTOLIC BLOOD PRESSURE: 64 MMHG | BODY MASS INDEX: 20.15 KG/M2 | HEART RATE: 80 BPM | WEIGHT: 128.4 LBS | HEIGHT: 67 IN | SYSTOLIC BLOOD PRESSURE: 128 MMHG

## 2021-11-03 DIAGNOSIS — I49.3 PVC (PREMATURE VENTRICULAR CONTRACTION): Primary | ICD-10-CM

## 2021-11-03 PROCEDURE — 99214 OFFICE O/P EST MOD 30 MIN: CPT | Performed by: INTERNAL MEDICINE

## 2021-11-03 PROCEDURE — G8484 FLU IMMUNIZE NO ADMIN: HCPCS | Performed by: INTERNAL MEDICINE

## 2021-11-03 PROCEDURE — 93000 ELECTROCARDIOGRAM COMPLETE: CPT | Performed by: INTERNAL MEDICINE

## 2021-11-03 PROCEDURE — 1036F TOBACCO NON-USER: CPT | Performed by: INTERNAL MEDICINE

## 2021-11-03 PROCEDURE — G8427 DOCREV CUR MEDS BY ELIG CLIN: HCPCS | Performed by: INTERNAL MEDICINE

## 2021-11-03 PROCEDURE — G8420 CALC BMI NORM PARAMETERS: HCPCS | Performed by: INTERNAL MEDICINE

## 2021-11-03 NOTE — PROGRESS NOTES
[facility]   Cardiac Consultation    Referring Provider:  No primary care provider on file. No chief complaint on file. HPI:  Carla Fair is a 28 y.o. female referred by Dr. Odalys Dan for PVC's. PMH of frequent PVC's. 24 hr Holter (2/6/20) showed SR with average HR 72 () with PVC burden of 7.3%. She was started on Metoprolol on 2/24/20. 48 hr Holter (3/3-3/5/20) showed SR with average HR 65 ( bpm) with PVC burden of 8%. Monitor also showed brief NSVT which appears to induce brief PAT. Cardiac MRI (2/21/20) showed no evidence of ARVC but slight LV dysfunction ( LVEF EF 52%). She states that she has passed out several times, but none recently. Once when she was 6 yrs old playing soccer and the other times were during pregnancy. Flecainide was started in 5/2020 and weaned off BB. Currently on flecainide 50 mg BID. Patient presents in sinus rhythm. Patient complains of occasional sweating and hot feet. Patient is staying active, running 1 mile a day. Denies complaints of palpitations, dizziness, CP, SOB, orthopnea, presyncope, or syncope. 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 Patient has had more anxiety since this past December. Past Medical History:   has a past medical history of Abnormal Pap smear, Anemia, and Migraines. Surgical History:   has a past surgical history that includes [place] tooth extraction; Cholecystectomy, laparoscopic (1/12/12); and Hysterectomy, vaginal (05/2015). Social History:   reports that she quit smoking about 11 years ago. Her smoking use included cigarettes. She smoked 0.25 packs per day. She has never used smokeless tobacco. She reports current alcohol use. She reports that she does not use drugs.      Family History:  family history includes Arthritis in her father; Asthma in her brother; Cancer in her maternal grandmother; Diabetes in her maternal grandmother; High Cholesterol in her father; [place] in her mother; Stroke in her maternal grandmother. Home Medications:  Outpatient Encounter Medications as of 11/3/2021   Medication Sig Dispense Refill    flecainide (TAMBOCOR) 50 MG tablet Take 1 tablet by mouth 2 times daily 180 tablet 3    ibuprofen (ADVIL;MOTRIN) 800 MG tablet Take 800 mg by mouth every 6 hours as needed for Pain      Aspirin-Acetaminophen-Caffeine (EXCEDRIN MIGRAINE PO) Take 1-2 tablets by mouth as needed Indications: takes with a coke        No facility-administered encounter medications on file as of 11/3/2021. Allergies:  Adhesive tape     Review of Systems   Constitutional: Negative. HENT: Negative. Eyes: Negative. Respiratory: Negative. Cardiovascular: Negative. Gastrointestinal: Negative. Genitourinary: Negative. Musculoskeletal: Negative. Skin: Negative. Neurological: Negative. Hematological: Negative. Psychiatric/Behavioral: Negative. LMP 2011       Objective:  Physical Exam   Constitutional: She is oriented to person, place, and time. She appears well-developed and well-nourished. HENT:   Head: Normocephalic and atraumatic. Eyes: Pupils are equal, round, and reactive to light. Neck: Normal range of motion. Cardiovascular: Normal rate, regular rhythm and normal heart sounds. Pulmonary/Chest: Effort normal and breath sounds normal.   Abdominal: Soft. No tenderness. Musculoskeletal: Normal range of motion. She exhibits no edema. Neurological: She is alert and oriented to person, place, and time. Skin: Skin is warm and dry. Psychiatric: She has a normal mood and affect. EK/3/21  Personally reviewed     Cardiac MRI 20  IMPRESSIONS:  The right ventricle is normal in size with preserved systolic function. Findings do not support Arrhythmogenic RV Cardiomyopathy.   There is late gadolinium enhancement of the inferoseptal right ventricular insertion point, a nonspecific finding suggestive of elevated right heart pressure. The left ventricle is moderately dilated. There is low normal systolic function. The left atrium is at the upper limit of normal in size.  There is mild mitral regurgitation. Frequently ectopy is noted. Assessment:  1. PVC's- 24 hr Holter in 2/2020 with PVC burden of 7.3%. 14 day Holter (3/2020) showed 8% PVC after being on metoprolol for 1 week. No difference in symptoms since being on metoprolol. Complains of occasional heart racing. LVEF 52% by MRI. We have discussed discussed option of  catheter ablation for treatment of PVC's. She is not interested in an invasive procedure at this time, but may consider it. Her PVCs are of outflow tract origin, probably LVOT. She has had clinical improvement on flecainide and has tolerated this medication for 18 months. Plan:  1. Continue flecainide 50 mg BID  2. No limitation in physical activity  3. Continue to monitor HR with apple watch  4. Follow up with EP NP in 6 months and me in 12 months     IHarry RN, am scribing for and in the presence of Dr. Kaci Manzanares. 11/03/21 4:21 PM  Harry Pineda RN     I, Dr. Kaci Manzanares, personally performed the services described in this documentation as scribed by Harry Pineda RN  in my presence, and it is both accurate and complete.           Kaci Manzanares M.D.

## 2022-04-25 NOTE — PROGRESS NOTES
[address]   Electrophysiology  Office Visit  Date: 5/9/2022    Chief Complaint   Patient presents with    Palpitations    Loss of Consciousness    Tachycardia       Cardiac HX: Luis Jara is a 39 y.o. woman with a h/o PVCs and syncope, referred by Dr. Jeni Aparicio for PVCs, 24-hour Holter monitor (2/6/2020) showed an avg HR 72 (47- 119), NSR, PVC burden of 7.3%, placed on metoprolol (2/24/2020), 48-hour Holter (3/3/2020-3/5/2020) showed an avg HR 65 (), NSR, a PVC burden of 8%, NSVT and PAT, cardiac MRI (2/21/2020) showed no evidence of ARVC but slight LV dysfunction with an EF of 52%, flecainide started 5/20/2020 and weaned off beta-blocker. Interval History/HPI: Patient is here for follow-up for PVCs and a history of syncope. Patient was originally diagnosed with PVCs in 2020 when she presented with complaints of palpitations and syncope. She wore an outpatient 24-hour Holter monitor that showed normal sinus rhythm at a 7.3% PVC burden. She was placed on metoprolol on February 24, 2020. She was then placed on flecainide 50 mg BID with s/s improvement. She is no longer taking the metoprolol. She states that she does not feel the PVCs like she did before. Her EKG today shows for PVCs. She denies any dizziness, lightheadedness or near syncopal events. She denies any chest pain, shortness of breath, PND, orthopnea or lower extremity edema. She is very active and has no limitations to her activity. She has not had an echo in the past.  She did have a cardiac MRI in 2020 that showed an EF of 52%.     Home medications:   Current Outpatient Medications on File Prior to Visit   Medication Sig Dispense Refill    flecainide (TAMBOCOR) 50 MG tablet Take 1 tablet by mouth 2 times daily 180 tablet 3    ibuprofen (ADVIL;MOTRIN) 800 MG tablet Take 800 mg by mouth every 6 hours as needed for Pain      Aspirin-Acetaminophen-Caffeine (EXCEDRIN MIGRAINE PO) Take 1-2 tablets by mouth as needed Indications: takes with a coke        No current facility-administered medications on file prior to visit. Past Medical History:   Diagnosis Date    Abnormal Pap smear     had biopsy negative    Anemia chronic, since childhood    was on iron but stopped taking    Migraines         Past Surgical History:   Procedure Laterality Date    CHOLECYSTECTOMY, LAPAROSCOPIC  1/12/12    LAPAROSCOPIC CHOLECYSTECTOMY WITH INTRAOPERATIVE    HYSTERECTOMY, VAGINAL  05/2015    cervical dysplasia and heavy bleeding    WISDOM TOOTH EXTRACTION         Allergies   Allergen Reactions    Adhesive Tape      Red skin       Social History:  Reviewed. reports that she quit smoking about 11 years ago. Her smoking use included cigarettes. She smoked 0.25 packs per day. She has never used smokeless tobacco. She reports current alcohol use. She reports that she does not use drugs. Family History:  Reviewed. family history includes Arthritis in her father; Asthma in her brother; Cancer in her maternal grandmother; Diabetes in her maternal grandmother; High Cholesterol in her father; [employer]a [employer] / [place] in her mother; Stroke in her maternal grandmother. Review of System:    · Constitutional: No fevers, chills. · Eyes: No visual changes or diplopia. No scleral icterus. · ENT: No Headaches. No mouth sores or sore throat. · Cardiovascular: No for chest pain, No for dyspnea on exertion, No for palpitations or No for loss of consciousness. No cough, hemoptysis, No for pleuritic pain, or phlebitis. · Respiratory: No for cough or wheezing. No hematemesis. · Gastrointestinal: No abdominal pain, blood in stools. · Genitourinary: No dysuria, or hematuria. · Musculoskeletal: No gait disturbance,    · Integumentary: No rash or pruritis. · Neurological: No headache, change in muscle strength, numbness or tingling. · Psychiatric: No anxiety, or depression. · Endocrine: No temperature intolerance.  No excessive thirst, fluid intake, or urination. · Hem/Lymph: No abnormal bruising or bleeding, blood clots or swollen lymph nodes. · Allergic/Immunologic: No nasal congestion or hives. Physical Examination:  Vitals:    05/09/22 1454   BP: 116/72   Pulse: 78         Wt Readings from Last 3 Encounters:   05/09/22 125 lb 9.6 oz (57 kg)   11/03/21 128 lb 6.4 oz (58.2 kg)   01/18/21 124 lb 6.4 oz (56.4 kg)       · Constitutional: Oriented. No distress. · Head: Normocephalic and atraumatic. · Mouth/Throat: Oropharynx is clear and moist.   · Eyes: Conjunctivae clear without jaunduice. PERRL. · Neck: Neck supple. No rigidity. No JVD present. · Cardiovascular: Normal rate, regular rhythm, S1&S2. · Pulmonary/Chest: Bilateral respiratory sounds. No wheezes, No rhonchi. · Abdominal: Soft. Bowel sounds present. No distension, No tenderness. · Musculoskeletal: No tenderness. No edema    · Lymphadenopathy: Has no cervical adenopathy. · Neurological: Alert and oriented. Cranial nerve appears intact, No Gross deficit   · Skin: Skin is warm and dry. No rash noted. · Psychiatric: Has a normal mood, affect and behavior     Labs:  Reviewed. No results for input(s): NA, K, CL, CO2, PHOS, BUN, CREATININE, CA in the last 72 hours. Invalid input(s):  TSH  No results for input(s): WBC, HGB, HCT, MCV, PLT in the last 72 hours. No results found for: CKTOTAL, CKMB, CKMBINDEX, TROPONINI  No results found for: BNP  No results found for: PROTIME, INR  No results found for: CHOL, HDL, TRIG    ECG: Personally reviewed: NSR, HR 61, , QRS 94, QTc 413    ECHO: N/A    Stress Test: N/A    Cardiac Angiography: N/A    Cardiac MRI: 2/21/2020  FINDINGS:       1. The left ventricular size is moderately dilated. The left ventricular ejection fraction is    52 % by Calixto's method. Global left ventricular function is low normal. The left ventricular    wall is diffusely hypokinetic.  The left ventricular mass is normal.  Frequent ectopy is noted.       2. There is no resting first pass myocardial perfusion defect. There is no late gadolinium    enhancement to suggest prior infarct, inflammation, or infiltration. Global, precontrast T1    mapping is borderline (1065 msec).  Regional fibrosis is noted in the septal, basal    inferolateral and mid inferior segments.   There is no evidence of myocardial edema by T2    weighted imaging (65.5 msec). There is no evidence of increased iron deposition within the    myocardium (T2*myocardium = 33 msec; if < 20 msec, suggestive of iron-overloading of the    myocardium).       3. The right ventricular size is normal. Global right ventricular function is normal. There are    no regional wall motion abnormalities of the right ventricular wall. There is late gadolinium    enhancement of the right ventricular insertion points.       4. Left atrial size is at the upper limit of normal. Right atrial size is normal.  The Qp/Qs is    1.0 by phase contrast imaging and is consistent with no evidence of shunt.       5. Velocity-encoded phase contrast Imaging in (2D/4D) was performed to assess valvular    function. Peak velocity at the aortic valve is 1.5 corresponding to a peak gradient of 9.0    mmHg. The calculated aortic regurgitant fraction is 0.0 %. There is mild mitral regurgitation. The calculated mitral regurgitant fraction is 14.21 %. Peak velocity at pulmonary valve is 0.64    corresponding to a peak gradient of 1.63 mmHg. The calculated pulmonic regurgitant fraction is    0.0 %.       6. There is a trivial pericardial effusion.           Problem List:   Patient Active Problem List    Diagnosis Date Noted    PVC (premature ventricular contraction) 03/04/2020    Abnormal Holter exam 02/18/2020    Anemia 12/20/2011    Cholelithiasis 11/29/2011    Abdominal pain, acute, right lower quadrant 11/01/2011    Hematuria 11/01/2011        Assessment:   1. PVC (premature ventricular contraction)    2.  Palpitations 3. Encounter for monitoring flecainide therapy    4. Syncope and collapse         Cardiac HX: Carole Tripp is a 39 y.o. woman with a h/o PVCs and syncope, referred by Dr. Pierre Stephens for PVCs, 24-hour Holter monitor (2/6/2020) showed an avg HR 72 (47- 119), NSR, PVC burden of 7.3%, placed on metoprolol (2/24/2020), 48-hour Holter (3/3/2020-3/5/2020) showed an avg HR 65 (), NSR, a PVC burden of 8%, NSVT and PAT, cardiac MRI (2/21/2020) showed no evidence of ARVC but slight LV dysfunction with an EF of 52%, flecainide started 5/20/2020 and weaned off beta-blocker. PVCs  - No symptoms  - 4 PVCs on EKG  - On flecainide 50 mg twice daily - QRS 94 - continue  - Consider RFA if recurrent palps on flecainide  - Will check an echo  - If EF has dropped will repeat monitor  - Reviewed recent labs  - F/u in 6 months  - ECG ordered and results personally reviewed     EF of 52%  No known HF  No known CAD  No known AF  No Tobacco use. All questions and concerns were addressed to the patient/family. Alternatives to my treatment were discussed. The note was completed using EMR. Every effort was made to ensure accuracy; however, inadvertent computerized transcription errors may be present. Patient received education regarding their diagnosis, treatment and medications while in the office today.       Lila Killings [address]

## 2022-05-09 ENCOUNTER — OFFICE VISIT (OUTPATIENT)
Dept: CARDIOLOGY CLINIC | Age: 37
End: 2022-05-09
Payer: COMMERCIAL

## 2022-05-09 VITALS
WEIGHT: 125.6 LBS | DIASTOLIC BLOOD PRESSURE: 72 MMHG | HEIGHT: 67 IN | BODY MASS INDEX: 19.71 KG/M2 | SYSTOLIC BLOOD PRESSURE: 116 MMHG | HEART RATE: 78 BPM

## 2022-05-09 DIAGNOSIS — Z79.899 ENCOUNTER FOR MONITORING FLECAINIDE THERAPY: ICD-10-CM

## 2022-05-09 DIAGNOSIS — R55 SYNCOPE AND COLLAPSE: ICD-10-CM

## 2022-05-09 DIAGNOSIS — R00.2 PALPITATIONS: ICD-10-CM

## 2022-05-09 DIAGNOSIS — I49.3 PVC (PREMATURE VENTRICULAR CONTRACTION): Primary | ICD-10-CM

## 2022-05-09 DIAGNOSIS — Z51.81 ENCOUNTER FOR MONITORING FLECAINIDE THERAPY: ICD-10-CM

## 2022-05-09 PROCEDURE — 99214 OFFICE O/P EST MOD 30 MIN: CPT | Performed by: NURSE PRACTITIONER

## 2022-05-09 PROCEDURE — 1036F TOBACCO NON-USER: CPT | Performed by: NURSE PRACTITIONER

## 2022-05-09 PROCEDURE — G8420 CALC BMI NORM PARAMETERS: HCPCS | Performed by: NURSE PRACTITIONER

## 2022-05-09 PROCEDURE — 93000 ELECTROCARDIOGRAM COMPLETE: CPT | Performed by: NURSE PRACTITIONER

## 2022-05-09 PROCEDURE — G8427 DOCREV CUR MEDS BY ELIG CLIN: HCPCS | Performed by: NURSE PRACTITIONER

## 2022-08-08 ENCOUNTER — PROCEDURE VISIT (OUTPATIENT)
Dept: CARDIOLOGY CLINIC | Age: 37
End: 2022-08-08
Payer: COMMERCIAL

## 2022-08-08 DIAGNOSIS — I49.3 PVC (PREMATURE VENTRICULAR CONTRACTION): ICD-10-CM

## 2022-08-08 LAB
LV EF: 55 %
LVEF MODALITY: NORMAL

## 2022-08-08 PROCEDURE — 93306 TTE W/DOPPLER COMPLETE: CPT | Performed by: INTERNAL MEDICINE

## 2022-08-29 RX ORDER — FLECAINIDE ACETATE 50 MG/1
50 TABLET ORAL 2 TIMES DAILY
Qty: 180 TABLET | Refills: 3 | Status: SHIPPED | OUTPATIENT
Start: 2022-08-29

## 2022-08-29 NOTE — TELEPHONE ENCOUNTER
I Medication Refills:    flecainide (TAMBOCOR) 50 MG tablet [[id number]]     Order Details  Dose: 50 mg Route: Oral Frequency: 2 TIMES DAILY   Dispense Quantity: 180 tablet Refills: 3          Sig: Take 1 tablet by mouth 2 times daily       [address], [address] [facility] [phone] - F [phone]   [address] [place] [address] [address]   Phone:  [phone]  Fax:  [phone]       Last Office Visit: 05/09/22    Next Office Visit: 11/21/22    Last Refill: 06/01/21    Last Labs: 01/27/20

## 2022-11-14 NOTE — PROGRESS NOTES
[address]   Electrophysiology  Office Visit  Date: 11/21/2022    Chief Complaint   Patient presents with    Palpitations    Loss of Consciousness         Cardiac HX: Flor Hills is a 39 y.o. woman with a h/o PVCs and syncope, referred by Dr. Siri Almazan for PVCs, 24-hour Holter monitor (2/6/2020) showed an avg HR 72 (47- 119), NSR, PVC burden of 7.3%, placed on metoprolol (2/24/2020), 48-hour Holter (3/3/2020-3/5/2020) showed an avg HR 65 (), NSR, a PVC burden of 8%, NSVT and PAT, cardiac MRI (2/21/2020) showed no evidence of ARVC but slight LV dysfunction with an EF of 52%, flecainide started 5/20/2020 and weaned off beta-blocker. Interval History/HPI: Patient is here for follow-up for PVCs and syncope. Patient had been originally diagnosed with PVCs in 2020 when she presented with complaints of palpitations and syncope. She had worn an outpatient 24-hour Holter monitor that showed NSR and a 7.3% PVC burden. She was placed on metoprolol in February 2020. She continued to complain of palpitations and was then placed on flecainide 50 mg BID with symptomatic improvement. She is no longer taking the metoprolol. Today she presents in sinus rhythm. She has not felt any issues with palpitations. She denies any heart racing or irregular heartbeats. She has no chest pain, shortness of breath, PND, orthopnea or lower extremity edema. We did do an echo and it showed her EF was 55%. She has had no issues with dizziness or lightheadedness. She has not passed out. Cardiac MRI in 2020 showed an EF of 52%.   Home medications:   Current Outpatient Medications on File Prior to Visit   Medication Sig Dispense Refill    flecainide (TAMBOCOR) 50 MG tablet Take 1 tablet by mouth 2 times daily 180 tablet 3    ibuprofen (ADVIL;MOTRIN) 800 MG tablet Take 800 mg by mouth every 6 hours as needed for Pain      Aspirin-Acetaminophen-Caffeine (EXCEDRIN MIGRAINE PO) Take 1-2 tablets by mouth as needed Indications: takes with a coke        No current facility-administered medications on file prior to visit. Past Medical History:   Diagnosis Date    Abnormal Pap smear     had biopsy negative    Anemia chronic, since childhood    was on iron but stopped taking    Migraines         Past Surgical History:   Procedure Laterality Date    CHOLECYSTECTOMY, LAPAROSCOPIC  1/12/12    LAPAROSCOPIC CHOLECYSTECTOMY WITH INTRAOPERATIVE    HYSTERECTOMY, VAGINAL  05/2015    cervical dysplasia and heavy bleeding    WISDOM TOOTH EXTRACTION         Allergies   Allergen Reactions    Adhesive Tape      Red skin       Social History:  Reviewed. reports that she quit smoking about 12 years ago. Her smoking use included cigarettes. She smoked an average of .25 packs per day. She has never used smokeless tobacco. She reports current alcohol use. She reports that she does not use drugs. Family History:  Reviewed. family history includes Arthritis in her father; Asthma in her brother; Cancer in her maternal grandmother; Diabetes in her maternal grandmother; High Cholesterol in her father; [place] / [place] in her mother; Stroke in her maternal grandmother. Review of System:    Constitutional: No fevers, chills. Eyes: No visual changes or diplopia. No scleral icterus. ENT: No Headaches. No mouth sores or sore throat. Cardiovascular: No for chest pain, No for dyspnea on exertion, No for palpitations or No for loss of consciousness. No cough, hemoptysis, No for pleuritic pain, or phlebitis. Respiratory: No for cough or wheezing. No hematemesis. Gastrointestinal: No abdominal pain, blood in stools. Genitourinary: No dysuria, or hematuria. Musculoskeletal: No gait disturbance,    Integumentary: No rash or pruritis. Neurological: No headache, change in muscle strength, numbness or tingling. Psychiatric: No anxiety, or depression. Endocrine: No temperature intolerance. No excessive thirst, fluid intake, or urination. Hem/Lymph: No abnormal bruising or bleeding, blood clots or swollen lymph nodes. Allergic/Immunologic: No nasal congestion or hives. Physical Examination:  Vitals:    11/21/22 1506   BP: 110/80   Pulse: 63           Wt Readings from Last 3 Encounters:   11/21/22 124 lb 12.8 oz (56.6 kg)   05/09/22 125 lb 9.6 oz (57 kg)   11/03/21 128 lb 6.4 oz (58.2 kg)       Constitutional: Oriented. No distress. Head: Normocephalic and atraumatic. Mouth/Throat: Oropharynx is clear and moist.   Eyes: Conjunctivae clear without jaunduice. PERRL. Neck: Neck supple. No rigidity. No JVD present. Cardiovascular: Normal rate, regular rhythm, S1&S2. Pulmonary/Chest: Bilateral respiratory sounds. No wheezes, No rhonchi. Abdominal: Soft. Bowel sounds present. No distension, No tenderness. Musculoskeletal: No tenderness. No edema    Lymphadenopathy: Has no cervical adenopathy. Neurological: Alert and oriented. Cranial nerve appears intact, No Gross deficit   Skin: Skin is warm and dry. No rash noted. Psychiatric: Has a normal mood, affect and behavior     Labs:  Reviewed. No results for input(s): NA, K, CL, CO2, PHOS, BUN, CREATININE, CA in the last 72 hours. Invalid input(s):  TSH  No results for input(s): WBC, HGB, HCT, MCV, PLT in the last 72 hours. No results found for: CKTOTAL, CKMB, CKMBINDEX, TROPONINI  No results found for: BNP  No results found for: PROTIME, INR  No results found for: CHOL, HDL, TRIG    ECG: Personally reviewed: NSR, HR 66, , QRS 94, QTc 416    ECHO: 8/8/2022  Summary   Normal left ventricle size, wall thickness, and systolic function with an   estimated ejection fraction of 55%. No regional wall motion abnormalities are seen. Diastolic filling parameters suggests normal diastolic functi    Stress Test: N/A    Cardiac Angiography: N/A    Cardiac MRI: 2/21/2020  FINDINGS:       1. The left ventricular size is moderately dilated.  The left ventricular ejection fraction is    52 % by Calixto's method. Global left ventricular function is low normal. The left ventricular    wall is diffusely hypokinetic. The left ventricular mass is normal.  Frequent ectopy is noted. 2. There is no resting first pass myocardial perfusion defect. There is no late gadolinium    enhancement to suggest prior infarct, inflammation, or infiltration. Global, precontrast T1    mapping is borderline (1065 msec). Regional fibrosis is noted in the septal, basal    inferolateral and mid inferior segments. There is no evidence of myocardial edema by T2    weighted imaging (65.5 msec). There is no evidence of increased iron deposition within the    myocardium (T2*myocardium = 33 msec; if < 20 msec, suggestive of iron-overloading of the    myocardium). 3. The right ventricular size is normal. Global right ventricular function is normal. There are    no regional wall motion abnormalities of the right ventricular wall. There is late gadolinium    enhancement of the right ventricular insertion points. 4. Left atrial size is at the upper limit of normal. Right atrial size is normal.  The Qp/Qs is    1.0 by phase contrast imaging and is consistent with no evidence of shunt. 5. Velocity-encoded phase contrast Imaging in (2D/4D) was performed to assess valvular    function. Peak velocity at the aortic valve is 1.5 corresponding to a peak gradient of 9.0    mmHg. The calculated aortic regurgitant fraction is 0.0 %. There is mild mitral regurgitation. The calculated mitral regurgitant fraction is 14.21 %. Peak velocity at pulmonary valve is 0.64    corresponding to a peak gradient of 1.63 mmHg. The calculated pulmonic regurgitant fraction is    0.0 %.       6. There is a trivial pericardial effusion.            Problem List:   Patient Active Problem List    Diagnosis Date Noted    PVC (premature ventricular contraction) 03/04/2020    Abnormal Holter exam 02/18/2020    Anemia 12/20/2011    Cholelithiasis 11/29/2011    Abdominal pain, acute, right lower quadrant 11/01/2011    Hematuria 11/01/2011        Assessment:   1. Palpitations    2. PVC (premature ventricular contraction)    3. Abnormal Holter exam    4. Syncope and collapse       Cardiac HX: Allyn Berg is a 39 y.o. woman with a h/o PVCs and syncope, referred by Dr. Roro Zamora for PVCs, 24-hour Holter monitor (2/6/2020) showed an avg HR 72 (47- 119), NSR, PVC burden of 7.3%, placed on metoprolol (2/24/2020), 48-hour Holter (3/3/2020-3/5/2020) showed an avg HR 65 (), NSR, a PVC burden of 8%, NSVT and PAT, cardiac MRI (2/21/2020) showed no evidence of ARVC but slight LV dysfunction with an EF of 52%, flecainide started 5/20/2020 and weaned off beta-blocker. PVCs  - No symptoms  - No PVCs on EKG  - On flecainide 50 mg twice daily - QRS 94 - continue  - Consider RFA if recurrent palps on flecainide  - Echo - EF 55%  - Reviewed recent labs  - F/u in1 year  - Encouraged to find a PCP  - ECG ordered and results personally reviewed     EF of 52%  No known HF  No known CAD  No known AF  No Tobacco use. All questions and concerns were addressed to the patient/family. Alternatives to my treatment were discussed. The note was completed using EMR. Every effort was made to ensure accuracy; however, inadvertent computerized transcription errors may be present. Patient received education regarding their diagnosis, treatment and medications while in the office today.       Collin Travis [address]

## 2022-11-21 ENCOUNTER — OFFICE VISIT (OUTPATIENT)
Dept: CARDIOLOGY CLINIC | Age: 37
End: 2022-11-21
Payer: COMMERCIAL

## 2022-11-21 VITALS
DIASTOLIC BLOOD PRESSURE: 80 MMHG | SYSTOLIC BLOOD PRESSURE: 110 MMHG | HEART RATE: 63 BPM | WEIGHT: 124.8 LBS | BODY MASS INDEX: 19.55 KG/M2

## 2022-11-21 DIAGNOSIS — R94.31 ABNORMAL HOLTER EXAM: ICD-10-CM

## 2022-11-21 DIAGNOSIS — R55 SYNCOPE AND COLLAPSE: ICD-10-CM

## 2022-11-21 DIAGNOSIS — R00.2 PALPITATIONS: Primary | ICD-10-CM

## 2022-11-21 DIAGNOSIS — I49.3 PVC (PREMATURE VENTRICULAR CONTRACTION): ICD-10-CM

## 2022-11-21 PROCEDURE — 1036F TOBACCO NON-USER: CPT | Performed by: NURSE PRACTITIONER

## 2022-11-21 PROCEDURE — G8427 DOCREV CUR MEDS BY ELIG CLIN: HCPCS | Performed by: NURSE PRACTITIONER

## 2022-11-21 PROCEDURE — G8484 FLU IMMUNIZE NO ADMIN: HCPCS | Performed by: NURSE PRACTITIONER

## 2022-11-21 PROCEDURE — 99213 OFFICE O/P EST LOW 20 MIN: CPT | Performed by: NURSE PRACTITIONER

## 2022-11-21 PROCEDURE — G8420 CALC BMI NORM PARAMETERS: HCPCS | Performed by: NURSE PRACTITIONER

## 2022-11-21 PROCEDURE — 93000 ELECTROCARDIOGRAM COMPLETE: CPT | Performed by: NURSE PRACTITIONER

## 2023-09-20 RX ORDER — FLECAINIDE ACETATE 50 MG/1
50 TABLET ORAL 2 TIMES DAILY
Qty: 180 TABLET | Refills: 3 | Status: SHIPPED | OUTPATIENT
Start: 2023-09-20

## 2023-09-20 NOTE — TELEPHONE ENCOUNTER
Requested Prescriptions     Pending Prescriptions Disp Refills    flecainide (TAMBOCOR) 50 MG tablet 180 tablet 3     Sig: Take 1 tablet by mouth 2 times daily        Last Office Visit: 11/21/2022     Next Office Visit: 12/7/2023

## 2023-10-02 RX ORDER — FLECAINIDE ACETATE 50 MG/1
50 TABLET ORAL 2 TIMES DAILY
Qty: 180 TABLET | Refills: 3 | Status: SHIPPED | OUTPATIENT
Start: 2023-10-02

## 2023-11-30 NOTE — PROGRESS NOTES
[facility]   Cardiac Electrophysiology Consultation   Date: 12/7/2023  Reason for Consultation:   Consult Requesting Physician: No att. providers found     Chief Complaint:   Chief Complaint   Patient presents with    Follow-up     1 Year follow up       HPI: Donal Staples is a 40 y.o. female, previous pt of Dr. Martha Enciso, with PMH significant for anxiety, palpitations, PVCs, and syncope, 24-hr Holter (2/2020) showed 7% PVC's, placed on metoprolol, 48-hour Holter (3/2020) showed 8% PVC's, NSVT, and PAT, cardiac MRI (2/2020) showed no evidence of ARVC but slight LV dysfunction with an EF of 52%, flecainide started (5/2020) and weaned off beta-blocker. Echo (8/2022) showed preserved LVEF. Interval History: Today, she is here for yearly f/u, presenting in SR with PVCs and stable QRS. She reports feeling occasional palpitations, worsening with anxiety. She feels the palpitations may have worsened in the past year, but not a significant change. 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 PVC - [facility] / [facility] inter-commissure exit    Assessment:  PVC's, symptomatic, on flecainide  Syncope  Anxiety    Low burden, minimally symptomatic, normal EF, on Flecainide    Plan:  Obtain 7 day CAM to assess PVC burden  Obtain echo   Continue flecainide 50 mg BID  Follow up in 6 months, sooner if monitor and echo are concerning      Past Medical History:   Diagnosis Date    Abnormal Pap smear     had biopsy negative    Anemia chronic, since childhood    was on iron but stopped taking    Migraines         Past Surgical History:   Procedure Laterality Date    CHOLECYSTECTOMY, LAPAROSCOPIC  1/12/12    LAPAROSCOPIC CHOLECYSTECTOMY WITH INTRAOPERATIVE    HYSTERECTOMY, VAGINAL  05/2015    cervical dysplasia and heavy bleeding    WISDOM TOOTH EXTRACTION         Allergies:   Allergies   Allergen Reactions    Adhesive Tape      Red skin       Medication:   Prior to Admission medications    Medication Sig Start Date End

## 2023-12-07 ENCOUNTER — OFFICE VISIT (OUTPATIENT)
Dept: CARDIOLOGY CLINIC | Age: 38
End: 2023-12-07
Payer: COMMERCIAL

## 2023-12-07 ENCOUNTER — NURSE ONLY (OUTPATIENT)
Dept: CARDIOLOGY CLINIC | Age: 38
End: 2023-12-07

## 2023-12-07 VITALS
HEART RATE: 71 BPM | WEIGHT: 134 LBS | SYSTOLIC BLOOD PRESSURE: 120 MMHG | BODY MASS INDEX: 20.99 KG/M2 | DIASTOLIC BLOOD PRESSURE: 70 MMHG

## 2023-12-07 DIAGNOSIS — I49.3 PVC (PREMATURE VENTRICULAR CONTRACTION): ICD-10-CM

## 2023-12-07 DIAGNOSIS — R55 SYNCOPE AND COLLAPSE: ICD-10-CM

## 2023-12-07 DIAGNOSIS — R00.2 PALPITATIONS: Primary | ICD-10-CM

## 2023-12-07 PROCEDURE — G8420 CALC BMI NORM PARAMETERS: HCPCS | Performed by: INTERNAL MEDICINE

## 2023-12-07 PROCEDURE — 1036F TOBACCO NON-USER: CPT | Performed by: INTERNAL MEDICINE

## 2023-12-07 PROCEDURE — G8484 FLU IMMUNIZE NO ADMIN: HCPCS | Performed by: INTERNAL MEDICINE

## 2023-12-07 PROCEDURE — G8427 DOCREV CUR MEDS BY ELIG CLIN: HCPCS | Performed by: INTERNAL MEDICINE

## 2023-12-07 PROCEDURE — 93242 EXT ECG>48HR<7D RECORDING: CPT | Performed by: INTERNAL MEDICINE

## 2023-12-07 PROCEDURE — 99214 OFFICE O/P EST MOD 30 MIN: CPT | Performed by: INTERNAL MEDICINE

## 2023-12-07 PROCEDURE — 93000 ELECTROCARDIOGRAM COMPLETE: CPT | Performed by: INTERNAL MEDICINE

## 2023-12-21 PROCEDURE — 93244 EXT ECG>48HR<7D REV&INTERPJ: CPT | Performed by: INTERNAL MEDICINE

## 2023-12-26 DIAGNOSIS — I49.8 OTHER CARDIAC ARRHYTHMIA: Primary | ICD-10-CM

## 2023-12-26 DIAGNOSIS — I49.3 PVC (PREMATURE VENTRICULAR CONTRACTION): ICD-10-CM

## 2023-12-27 ENCOUNTER — TELEPHONE (OUTPATIENT)
Dept: CARDIOLOGY CLINIC | Age: 38
End: 2023-12-27

## 2023-12-27 NOTE — TELEPHONE ENCOUNTER
Reviewed results of monitor with pt. Per UL, no changes.  Confirmed f/u with FW in 6/2024, sooner if echo scheduled for tomorrow is abnormal.

## 2023-12-28 ENCOUNTER — PROCEDURE VISIT (OUTPATIENT)
Dept: CARDIOLOGY CLINIC | Age: 38
End: 2023-12-28
Payer: COMMERCIAL

## 2023-12-28 DIAGNOSIS — I49.3 PVC (PREMATURE VENTRICULAR CONTRACTION): ICD-10-CM

## 2023-12-28 PROCEDURE — 93306 TTE W/DOPPLER COMPLETE: CPT | Performed by: INTERNAL MEDICINE

## 2024-03-28 ENCOUNTER — OFFICE VISIT (OUTPATIENT)
Dept: PRIMARY CARE CLINIC | Age: 39
End: 2024-03-28
Payer: COMMERCIAL

## 2024-03-28 VITALS
HEIGHT: 68 IN | TEMPERATURE: 97.6 F | SYSTOLIC BLOOD PRESSURE: 118 MMHG | WEIGHT: 136.4 LBS | BODY MASS INDEX: 20.67 KG/M2 | HEART RATE: 66 BPM | DIASTOLIC BLOOD PRESSURE: 70 MMHG | OXYGEN SATURATION: 94 %

## 2024-03-28 DIAGNOSIS — Z13.21 ENCOUNTER FOR VITAMIN DEFICIENCY SCREENING: ICD-10-CM

## 2024-03-28 DIAGNOSIS — Z13.220 LIPID SCREENING: ICD-10-CM

## 2024-03-28 DIAGNOSIS — I49.3 PVC (PREMATURE VENTRICULAR CONTRACTION): ICD-10-CM

## 2024-03-28 DIAGNOSIS — R00.2 PALPITATIONS: ICD-10-CM

## 2024-03-28 DIAGNOSIS — Z23 NEED FOR TDAP VACCINATION: ICD-10-CM

## 2024-03-28 DIAGNOSIS — Z13.1 DIABETES MELLITUS SCREENING: ICD-10-CM

## 2024-03-28 DIAGNOSIS — R00.2 PALPITATIONS: Primary | ICD-10-CM

## 2024-03-28 LAB
25(OH)D3 SERPL-MCNC: 32.6 NG/ML
ALBUMIN SERPL-MCNC: 4.5 G/DL (ref 3.4–5)
ALBUMIN/GLOB SERPL: 1.9 {RATIO} (ref 1.1–2.2)
ALP SERPL-CCNC: 80 U/L (ref 40–129)
ALT SERPL-CCNC: 17 U/L (ref 10–40)
ANION GAP SERPL CALCULATED.3IONS-SCNC: 7 MMOL/L (ref 3–16)
AST SERPL-CCNC: 19 U/L (ref 15–37)
BASOPHILS # BLD: 0.1 K/UL (ref 0–0.2)
BASOPHILS NFR BLD: 1.7 %
BILIRUB SERPL-MCNC: 0.3 MG/DL (ref 0–1)
BUN SERPL-MCNC: 7 MG/DL (ref 7–20)
CALCIUM SERPL-MCNC: 9.4 MG/DL (ref 8.3–10.6)
CHLORIDE SERPL-SCNC: 102 MMOL/L (ref 99–110)
CHOLEST SERPL-MCNC: 187 MG/DL (ref 0–199)
CO2 SERPL-SCNC: 28 MMOL/L (ref 21–32)
CREAT SERPL-MCNC: 0.6 MG/DL (ref 0.6–1.1)
DEPRECATED RDW RBC AUTO: 12.8 % (ref 12.4–15.4)
EOSINOPHIL # BLD: 1 K/UL (ref 0–0.6)
EOSINOPHIL NFR BLD: 16.3 %
GFR SERPLBLD CREATININE-BSD FMLA CKD-EPI: >90 ML/MIN/{1.73_M2}
GLUCOSE P FAST SERPL-MCNC: 84 MG/DL (ref 70–99)
HCT VFR BLD AUTO: 42.8 % (ref 36–48)
HDLC SERPL-MCNC: 70 MG/DL (ref 40–60)
HGB BLD-MCNC: 14.4 G/DL (ref 12–16)
LDL CHOLESTEROL CALCULATED: 102 MG/DL
LYMPHOCYTES # BLD: 1.4 K/UL (ref 1–5.1)
LYMPHOCYTES NFR BLD: 21.3 %
MCH RBC QN AUTO: 33.3 PG (ref 26–34)
MCHC RBC AUTO-ENTMCNC: 33.6 G/DL (ref 31–36)
MCV RBC AUTO: 99.1 FL (ref 80–100)
MONOCYTES # BLD: 0.4 K/UL (ref 0–1.3)
MONOCYTES NFR BLD: 6.5 %
NEUTROPHILS # BLD: 3.4 K/UL (ref 1.7–7.7)
NEUTROPHILS NFR BLD: 54.2 %
PLATELET # BLD AUTO: 221 K/UL (ref 135–450)
PMV BLD AUTO: 9.7 FL (ref 5–10.5)
POTASSIUM SERPL-SCNC: 4 MMOL/L (ref 3.5–5.1)
PROT SERPL-MCNC: 6.9 G/DL (ref 6.4–8.2)
RBC # BLD AUTO: 4.32 M/UL (ref 4–5.2)
SODIUM SERPL-SCNC: 137 MMOL/L (ref 136–145)
TRIGL SERPL-MCNC: 75 MG/DL (ref 0–150)
TSH SERPL DL<=0.005 MIU/L-ACNC: 2.64 UIU/ML (ref 0.27–4.2)
VLDLC SERPL CALC-MCNC: 15 MG/DL
WBC # BLD AUTO: 6.3 K/UL (ref 4–11)

## 2024-03-28 PROCEDURE — 90471 IMMUNIZATION ADMIN: CPT | Performed by: NURSE PRACTITIONER

## 2024-03-28 PROCEDURE — G8427 DOCREV CUR MEDS BY ELIG CLIN: HCPCS | Performed by: NURSE PRACTITIONER

## 2024-03-28 PROCEDURE — G8484 FLU IMMUNIZE NO ADMIN: HCPCS | Performed by: NURSE PRACTITIONER

## 2024-03-28 PROCEDURE — 90715 TDAP VACCINE 7 YRS/> IM: CPT | Performed by: NURSE PRACTITIONER

## 2024-03-28 PROCEDURE — G8420 CALC BMI NORM PARAMETERS: HCPCS | Performed by: NURSE PRACTITIONER

## 2024-03-28 PROCEDURE — 1036F TOBACCO NON-USER: CPT | Performed by: NURSE PRACTITIONER

## 2024-03-28 PROCEDURE — 99204 OFFICE O/P NEW MOD 45 MIN: CPT | Performed by: NURSE PRACTITIONER

## 2024-03-28 RX ORDER — M-VIT,TX,IRON,MINS/CALC/FOLIC 27MG-0.4MG
1 TABLET ORAL DAILY
COMMUNITY

## 2024-03-28 RX ORDER — ASCORBIC ACID 500 MG
500 TABLET ORAL DAILY
COMMUNITY

## 2024-03-28 SDOH — ECONOMIC STABILITY: FOOD INSECURITY: WITHIN THE PAST 12 MONTHS, YOU WORRIED THAT YOUR FOOD WOULD RUN OUT BEFORE YOU GOT MONEY TO BUY MORE.: NEVER TRUE

## 2024-03-28 SDOH — ECONOMIC STABILITY: HOUSING INSECURITY
IN THE LAST 12 MONTHS, WAS THERE A TIME WHEN YOU DID NOT HAVE A STEADY PLACE TO SLEEP OR SLEPT IN A SHELTER (INCLUDING NOW)?: NO

## 2024-03-28 SDOH — ECONOMIC STABILITY: FOOD INSECURITY: WITHIN THE PAST 12 MONTHS, THE FOOD YOU BOUGHT JUST DIDN'T LAST AND YOU DIDN'T HAVE MONEY TO GET MORE.: NEVER TRUE

## 2024-03-28 SDOH — ECONOMIC STABILITY: INCOME INSECURITY: HOW HARD IS IT FOR YOU TO PAY FOR THE VERY BASICS LIKE FOOD, HOUSING, MEDICAL CARE, AND HEATING?: NOT HARD AT ALL

## 2024-03-28 ASSESSMENT — PATIENT HEALTH QUESTIONNAIRE - PHQ9
SUM OF ALL RESPONSES TO PHQ QUESTIONS 1-9: 2
7. TROUBLE CONCENTRATING ON THINGS, SUCH AS READING THE NEWSPAPER OR WATCHING TELEVISION: NOT AT ALL
SUM OF ALL RESPONSES TO PHQ QUESTIONS 1-9: 2
2. FEELING DOWN, DEPRESSED OR HOPELESS: NOT AT ALL
9. THOUGHTS THAT YOU WOULD BE BETTER OFF DEAD, OR OF HURTING YOURSELF: NOT AT ALL
8. MOVING OR SPEAKING SO SLOWLY THAT OTHER PEOPLE COULD HAVE NOTICED. OR THE OPPOSITE, BEING SO FIGETY OR RESTLESS THAT YOU HAVE BEEN MOVING AROUND A LOT MORE THAN USUAL: NOT AT ALL
6. FEELING BAD ABOUT YOURSELF - OR THAT YOU ARE A FAILURE OR HAVE LET YOURSELF OR YOUR FAMILY DOWN: NOT AT ALL
1. LITTLE INTEREST OR PLEASURE IN DOING THINGS: NOT AT ALL
10. IF YOU CHECKED OFF ANY PROBLEMS, HOW DIFFICULT HAVE THESE PROBLEMS MADE IT FOR YOU TO DO YOUR WORK, TAKE CARE OF THINGS AT HOME, OR GET ALONG WITH OTHER PEOPLE: NOT DIFFICULT AT ALL
3. TROUBLE FALLING OR STAYING ASLEEP: SEVERAL DAYS
SUM OF ALL RESPONSES TO PHQ9 QUESTIONS 1 & 2: 0
SUM OF ALL RESPONSES TO PHQ QUESTIONS 1-9: 2
4. FEELING TIRED OR HAVING LITTLE ENERGY: SEVERAL DAYS
5. POOR APPETITE OR OVEREATING: NOT AT ALL
SUM OF ALL RESPONSES TO PHQ QUESTIONS 1-9: 2

## 2024-03-28 ASSESSMENT — ANXIETY QUESTIONNAIRES
2. NOT BEING ABLE TO STOP OR CONTROL WORRYING: NOT AT ALL
1. FEELING NERVOUS, ANXIOUS, OR ON EDGE: SEVERAL DAYS
IF YOU CHECKED OFF ANY PROBLEMS ON THIS QUESTIONNAIRE, HOW DIFFICULT HAVE THESE PROBLEMS MADE IT FOR YOU TO DO YOUR WORK, TAKE CARE OF THINGS AT HOME, OR GET ALONG WITH OTHER PEOPLE: NOT DIFFICULT AT ALL
GAD7 TOTAL SCORE: 2
6. BECOMING EASILY ANNOYED OR IRRITABLE: SEVERAL DAYS
5. BEING SO RESTLESS THAT IT IS HARD TO SIT STILL: NOT AT ALL
4. TROUBLE RELAXING: NOT AT ALL
7. FEELING AFRAID AS IF SOMETHING AWFUL MIGHT HAPPEN: NOT AT ALL
3. WORRYING TOO MUCH ABOUT DIFFERENT THINGS: NOT AT ALL

## 2024-03-28 ASSESSMENT — ENCOUNTER SYMPTOMS
WHEEZING: 0
GASTROINTESTINAL NEGATIVE: 1
CHEST TIGHTNESS: 0
SHORTNESS OF BREATH: 0
COUGH: 0

## 2024-03-28 NOTE — PROGRESS NOTES
50 MG tablet Take 1 tablet by mouth 2 times daily 180 tablet 3    ibuprofen (ADVIL;MOTRIN) 800 MG tablet Take 1 tablet by mouth every 6 hours as needed for Pain      Aspirin-Acetaminophen-Caffeine (EXCEDRIN MIGRAINE PO) Take 1-2 tablets by mouth as needed Indications: takes with a coke        No current facility-administered medications on file prior to visit.      Allergies   Allergen Reactions    Adhesive Tape      Red skin     Past Medical History:   Diagnosis Date    Abnormal Pap smear     had biopsy negative    Anemia chronic, since childhood    was on iron but stopped taking    Cholelithiasis 2011    Migraines      Past Surgical History:   Procedure Laterality Date    CHOLECYSTECTOMY, LAPAROSCOPIC  12    LAPAROSCOPIC CHOLECYSTECTOMY WITH INTRAOPERATIVE    HYSTERECTOMY, VAGINAL  2015    cervical dysplasia and heavy bleeding    WISDOM TOOTH EXTRACTION        Social History     Tobacco Use    Smoking status: Former     Current packs/day: 0.00     Types: Cigarettes     Quit date: 2010     Years since quittin.5    Smokeless tobacco: Never   Substance Use Topics    Alcohol use: Yes     Comment: scant      Family History   Problem Relation Age of Onset    Miscarriages / Stillbirths Mother     Arthritis Father     High Cholesterol Father     Asthma Brother     Cancer Maternal Grandmother     Diabetes Maternal Grandmother     Stroke Maternal Grandmother         Vitals:    24 1003 24 1021   BP: 118/70    Site: Right Upper Arm    Position: Sitting    Cuff Size: Medium Adult    Pulse: (!) 45 66   Temp: 97.6 °F (36.4 °C)    SpO2: 94%    Weight: 61.9 kg (136 lb 6.4 oz)    Height: 1.715 m (5' 7.52\")      Estimated body mass index is 21.04 kg/m² as calculated from the following:    Height as of this encounter: 1.715 m (5' 7.52\").    Weight as of this encounter: 61.9 kg (136 lb 6.4 oz).    Physical Exam  Vitals and nursing note reviewed.   Constitutional:       General: She is not in acute

## 2024-03-29 LAB
EST. AVERAGE GLUCOSE BLD GHB EST-MCNC: 88.2 MG/DL
HBA1C MFR BLD: 4.7 %

## 2024-04-25 ENCOUNTER — OFFICE VISIT (OUTPATIENT)
Dept: PRIMARY CARE CLINIC | Age: 39
End: 2024-04-25
Payer: COMMERCIAL

## 2024-04-25 VITALS
BODY MASS INDEX: 21.47 KG/M2 | TEMPERATURE: 97.9 F | OXYGEN SATURATION: 98 % | WEIGHT: 133.6 LBS | HEART RATE: 80 BPM | SYSTOLIC BLOOD PRESSURE: 118 MMHG | DIASTOLIC BLOOD PRESSURE: 76 MMHG | HEIGHT: 66 IN

## 2024-04-25 DIAGNOSIS — Z00.00 ANNUAL PHYSICAL EXAM: Primary | ICD-10-CM

## 2024-04-25 PROCEDURE — 99395 PREV VISIT EST AGE 18-39: CPT | Performed by: NURSE PRACTITIONER

## 2024-04-25 ASSESSMENT — ANXIETY QUESTIONNAIRES
IF YOU CHECKED OFF ANY PROBLEMS ON THIS QUESTIONNAIRE, HOW DIFFICULT HAVE THESE PROBLEMS MADE IT FOR YOU TO DO YOUR WORK, TAKE CARE OF THINGS AT HOME, OR GET ALONG WITH OTHER PEOPLE: NOT DIFFICULT AT ALL
6. BECOMING EASILY ANNOYED OR IRRITABLE: SEVERAL DAYS
GAD7 TOTAL SCORE: 3
1. FEELING NERVOUS, ANXIOUS, OR ON EDGE: SEVERAL DAYS
7. FEELING AFRAID AS IF SOMETHING AWFUL MIGHT HAPPEN: NOT AT ALL
3. WORRYING TOO MUCH ABOUT DIFFERENT THINGS: NOT AT ALL
5. BEING SO RESTLESS THAT IT IS HARD TO SIT STILL: NOT AT ALL
4. TROUBLE RELAXING: NOT AT ALL
2. NOT BEING ABLE TO STOP OR CONTROL WORRYING: SEVERAL DAYS

## 2024-04-25 ASSESSMENT — ENCOUNTER SYMPTOMS
SHORTNESS OF BREATH: 0
CHEST TIGHTNESS: 0
CONSTIPATION: 0
DIARRHEA: 0

## 2024-04-25 ASSESSMENT — PATIENT HEALTH QUESTIONNAIRE - PHQ9
SUM OF ALL RESPONSES TO PHQ QUESTIONS 1-9: 0
SUM OF ALL RESPONSES TO PHQ QUESTIONS 1-9: 0
1. LITTLE INTEREST OR PLEASURE IN DOING THINGS: NOT AT ALL
SUM OF ALL RESPONSES TO PHQ QUESTIONS 1-9: 0
SUM OF ALL RESPONSES TO PHQ9 QUESTIONS 1 & 2: 0
2. FEELING DOWN, DEPRESSED OR HOPELESS: NOT AT ALL
SUM OF ALL RESPONSES TO PHQ QUESTIONS 1-9: 0

## 2024-04-25 NOTE — PROGRESS NOTES
4/25/2024    Chief Complaint   Patient presents with    Annual Exam     38-year-old female physical exam (w/o PAP)    Discuss Labs       Ana Maria Peralta is a 38 y.o. female, presents today for annual physical exam and to review lab results    HPI   Patient reports she is overall healthy and has no health concerns today.  She follows a healthy diet and exercises regularly.  Patient admits to not doing monthly self breast exams.  Reports she will start-written instructions provided.  Patient sees her gynecologist yearly.    CARE GAPS:   - None    Review of lab results  The available labs reviewed and analyzed and independent interpretation of the results explained to patient at length.         Comprehensive Metabolic Panel, Fasting   Component  Ref Range & Units 3/28/24 1041 1/27/20 1239 1/5/12 2015 3/28/11 1249   Sodium  136 - 145 mmol/L 137 139     Potassium  3.5 - 5.1 mmol/L 4.0 4.2     Chloride  99 - 110 mmol/L 102 101     CO2  21 - 32 mmol/L 28 23     Anion Gap  3 - 16 7 15     Glucose, Fasting  70 - 99 mg/dL 84   65 R, CM   BUN  7 - 20 mg/dL 7 9     Creatinine  0.6 - 1.1 mg/dL 0.6 0.6     Est, Glom Filt Rate  >60 >90      Comment: Pediatric calculator link  https://www.kidney.org/professionals/kdoqi/gfr_calculatorped  Effective Oct 3, 2022  These results are not intended for use in patients  <18 years of age.  eGFR results are calculated without  a race factor using the 2021 CKD-EPI equation.  Careful  clinical correlation is recommended, particularly when  comparing to results calculated using previous equations.  The CKD-EPI equation is less accurate in patients with  extremes of muscle mass, extra-renal metabolism of  creatinine, excessive creatinine ingestion, or following  therapy that affects renal tubular secretion.   Calcium  8.3 - 10.6 mg/dL 9.4 9.2     Total Protein  6.4 - 8.2 g/dL 6.9  8.1 R    Albumin  3.4 - 5.0 g/dL 4.5  4.7 R    Albumin/Globulin Ratio  1.1 - 2.2 1.9      Total Bilirubin  0.0 -

## 2024-07-16 NOTE — PROGRESS NOTES
PVC 9%, unifocal.  PAC 0.4%.  3 short AT episodes, longest 6 beats, fastest 144 bpm    EP studies / cardioversions   No results found for this or any previous visit.    ADDITIONAL IMAGING:   No results found for this or any previous visit.    LABS     Lab Results   Component Value Date    CHOLFAST 187 03/28/2024    TRIGLYCFAST 75 03/28/2024    HDL 70 (H) 03/28/2024     Lab Results   Component Value Date     (H) 03/28/2024     Hemoglobin A1C   Date Value Ref Range Status   03/28/2024 4.7 See comment % Final     Comment:     Comment:  Diagnosis of Diabetes: > or = 6.5%  Increased risk of diabetes (Prediabetes): 5.7-6.4%  Glycemic Control: Nonpregnant Adults: <7.0%                    Pregnant: <6.0%          Lab Results   Component Value Date    TSHFT4 2.64 03/28/2024     Lab Results   Component Value Date    WBC 6.3 03/28/2024    HGB 14.4 03/28/2024    HCT 42.8 03/28/2024    MCV 99.1 03/28/2024     03/28/2024     Lab Results   Component Value Date     03/28/2024    K 4.0 03/28/2024     03/28/2024    CO2 28 03/28/2024    BUN 7 03/28/2024    CREATININE 0.6 03/28/2024    GLUCOSE 99 01/27/2020    CALCIUM 9.4 03/28/2024    BILITOT 0.3 03/28/2024    ALKPHOS 80 03/28/2024    AST 19 03/28/2024    ALT 17 03/28/2024    LABGLOM >90 03/28/2024    GFRAA >60 01/27/2020    AGRATIO 1.9 03/28/2024     Outside/Care everywhere records Reviewed  Labs Reviewed  Prior Imaging, ekg, cath, echo reviewed when available  Medications reviewed  Old Notes reviewed  ASSESSMENT AND PLAN     Encounter Diagnoses   Name Primary?    PVC (premature ventricular contraction) Yes    NSVT (nonsustained ventricular tachycardia) (HCC)     Palpitations      I reviewed with patient that her PVC morphology is from the RVOT, which is a benign focus.  RVOT VT can occur and is generally well-tolerated with a very low risk of sudden cardiac death.    Given that she 1) has no symptoms 2) has had no change (actually an increase in burden)

## 2024-07-24 PROBLEM — R00.2 PALPITATIONS: Status: ACTIVE | Noted: 2024-07-24

## 2024-07-24 PROBLEM — Z87.898 HISTORY OF SYNCOPE: Status: ACTIVE | Noted: 2024-07-24

## 2024-08-09 ENCOUNTER — OFFICE VISIT (OUTPATIENT)
Dept: CARDIOLOGY CLINIC | Age: 39
End: 2024-08-09
Payer: COMMERCIAL

## 2024-08-09 VITALS
DIASTOLIC BLOOD PRESSURE: 52 MMHG | HEIGHT: 67 IN | BODY MASS INDEX: 20.03 KG/M2 | OXYGEN SATURATION: 97 % | HEART RATE: 88 BPM | SYSTOLIC BLOOD PRESSURE: 92 MMHG | WEIGHT: 127.6 LBS

## 2024-08-09 DIAGNOSIS — I49.3 PVC (PREMATURE VENTRICULAR CONTRACTION): Primary | ICD-10-CM

## 2024-08-09 DIAGNOSIS — I47.29 NSVT (NONSUSTAINED VENTRICULAR TACHYCARDIA) (HCC): ICD-10-CM

## 2024-08-09 DIAGNOSIS — R00.2 PALPITATIONS: ICD-10-CM

## 2024-08-09 PROCEDURE — G8420 CALC BMI NORM PARAMETERS: HCPCS | Performed by: INTERNAL MEDICINE

## 2024-08-09 PROCEDURE — G8427 DOCREV CUR MEDS BY ELIG CLIN: HCPCS | Performed by: INTERNAL MEDICINE

## 2024-08-09 PROCEDURE — 99204 OFFICE O/P NEW MOD 45 MIN: CPT | Performed by: INTERNAL MEDICINE

## 2024-08-09 PROCEDURE — 1036F TOBACCO NON-USER: CPT | Performed by: INTERNAL MEDICINE

## 2024-08-09 NOTE — PATIENT INSTRUCTIONS
Follow up with Dr Restrepo in December with echo same day     7 day heart monitor in November     Discontinue Flecainide- monitor for symptoms. If you have fainting, heart racing while at rest, if it races with excitement but doesn't slow down quickly call the office.     Call for any questions or concerns.

## 2024-09-04 ENCOUNTER — OFFICE VISIT (OUTPATIENT)
Dept: PRIMARY CARE CLINIC | Age: 39
End: 2024-09-04
Payer: COMMERCIAL

## 2024-09-04 VITALS
DIASTOLIC BLOOD PRESSURE: 72 MMHG | OXYGEN SATURATION: 100 % | SYSTOLIC BLOOD PRESSURE: 120 MMHG | WEIGHT: 127.8 LBS | TEMPERATURE: 97.7 F | HEART RATE: 80 BPM | HEIGHT: 67 IN | BODY MASS INDEX: 20.06 KG/M2

## 2024-09-04 DIAGNOSIS — T14.8XXA BRUISING: Primary | ICD-10-CM

## 2024-09-04 PROCEDURE — G8427 DOCREV CUR MEDS BY ELIG CLIN: HCPCS | Performed by: NURSE PRACTITIONER

## 2024-09-04 PROCEDURE — 4004F PT TOBACCO SCREEN RCVD TLK: CPT | Performed by: NURSE PRACTITIONER

## 2024-09-04 PROCEDURE — 99213 OFFICE O/P EST LOW 20 MIN: CPT | Performed by: NURSE PRACTITIONER

## 2024-09-04 PROCEDURE — G8420 CALC BMI NORM PARAMETERS: HCPCS | Performed by: NURSE PRACTITIONER

## 2024-09-04 RX ORDER — MAGNESIUM 30 MG
30 TABLET ORAL 2 TIMES DAILY
COMMUNITY

## 2024-09-04 ASSESSMENT — ENCOUNTER SYMPTOMS
WHEEZING: 0
ROS SKIN COMMENTS: POSITIVE FOR BRUISING
CHEST TIGHTNESS: 0
SHORTNESS OF BREATH: 0
COUGH: 0
GASTROINTESTINAL NEGATIVE: 1

## 2024-09-04 NOTE — PROGRESS NOTES
9/4/2024    Chief Complaint   Patient presents with    Bleeding/Bruising      Increased bruising to bilateral legs       Ana Maria Peralta is a 38 y.o. female, presents today for evaluation of bruising    HPI   Increased bruising  Patient reports historically she has always bruised easy, however she reports increased bruising to bilateral legs that has significantly worsened over the past 3 weeks. She first noticed abnormal bruising around middle of July 2024.      Distribution of bruising mainly along distal extremities, bilateral legs. Denies bruising on upper extremities or back.   Patient denies injury or trauma.  Denies other evidence of bleeding (eg, recurrent epistaxis, gingival bleeding, hemarthrosis)  No personal or family history of significant unexpected bleeding.  Denies family or personal history of clotting disorders. Patient doesn't know a lot about father's family hx, and is not aware of any disorders.   Denies abuse.    Started magnesium 30 mg around May 2024. She only takes Excedrin migraine headaches and motrin ovarian cysts (started after hyst). Takes ~6 doses total a month.       Review of Systems   Constitutional:  Negative for activity change, appetite change, diaphoresis, fatigue and unexpected weight change.   Respiratory:  Negative for cough, chest tightness, shortness of breath and wheezing.    Cardiovascular:  Negative for chest pain, palpitations and leg swelling.   Gastrointestinal: Negative.    Skin:  Negative for wound.        Positive for bruising   Neurological:  Negative for dizziness, facial asymmetry, weakness, light-headedness, numbness and headaches.   Psychiatric/Behavioral: Negative.         Current Outpatient Medications on File Prior to Visit   Medication Sig Dispense Refill    magnesium 30 MG tablet Take 1 tablet by mouth 2 times daily      Multiple Vitamins-Minerals (THERAPEUTIC MULTIVITAMIN-MINERALS) tablet Take 1 tablet by mouth daily      vitamin C (ASCORBIC ACID)

## 2024-09-09 DIAGNOSIS — T14.8XXA BRUISING: ICD-10-CM

## 2024-09-09 LAB
APTT BLD: 27.9 SEC (ref 22.1–36.4)
INR PPP: 0.97 (ref 0.85–1.15)
PROTHROMBIN TIME: 13.1 SEC (ref 11.9–14.9)

## 2024-09-10 LAB
ALBUMIN SERPL-MCNC: 4.2 G/DL (ref 3.4–5)
ALBUMIN/GLOB SERPL: 2 {RATIO} (ref 1.1–2.2)
ALP SERPL-CCNC: 65 U/L (ref 40–129)
ALT SERPL-CCNC: 26 U/L (ref 10–40)
ANION GAP SERPL CALCULATED.3IONS-SCNC: 8 MMOL/L (ref 3–16)
AST SERPL-CCNC: 24 U/L (ref 15–37)
BASOPHILS # BLD: 0.1 K/UL (ref 0–0.2)
BASOPHILS NFR BLD: 1.1 %
BILIRUB SERPL-MCNC: 0.4 MG/DL (ref 0–1)
BUN SERPL-MCNC: 7 MG/DL (ref 7–20)
CALCIUM SERPL-MCNC: 9.4 MG/DL (ref 8.3–10.6)
CHLORIDE SERPL-SCNC: 105 MMOL/L (ref 99–110)
CO2 SERPL-SCNC: 26 MMOL/L (ref 21–32)
CREAT SERPL-MCNC: 0.7 MG/DL (ref 0.6–1.1)
DEPRECATED RDW RBC AUTO: 12.3 % (ref 12.4–15.4)
EOSINOPHIL # BLD: 0.9 K/UL (ref 0–0.6)
EOSINOPHIL NFR BLD: 18.6 %
GFR SERPLBLD CREATININE-BSD FMLA CKD-EPI: >90 ML/MIN/{1.73_M2}
GLUCOSE P FAST SERPL-MCNC: 87 MG/DL (ref 70–99)
HCT VFR BLD AUTO: 37.9 % (ref 36–48)
HGB BLD-MCNC: 13 G/DL (ref 12–16)
LYMPHOCYTES # BLD: 1.5 K/UL (ref 1–5.1)
LYMPHOCYTES NFR BLD: 31.6 %
MCH RBC QN AUTO: 34.9 PG (ref 26–34)
MCHC RBC AUTO-ENTMCNC: 34.3 G/DL (ref 31–36)
MCV RBC AUTO: 101.6 FL (ref 80–100)
MONOCYTES # BLD: 0.4 K/UL (ref 0–1.3)
MONOCYTES NFR BLD: 8 %
NEUTROPHILS # BLD: 2 K/UL (ref 1.7–7.7)
NEUTROPHILS NFR BLD: 40.7 %
PLATELET # BLD AUTO: 178 K/UL (ref 135–450)
PMV BLD AUTO: 9.9 FL (ref 5–10.5)
POTASSIUM SERPL-SCNC: 4.1 MMOL/L (ref 3.5–5.1)
PROT SERPL-MCNC: 6.3 G/DL (ref 6.4–8.2)
RBC # BLD AUTO: 3.74 M/UL (ref 4–5.2)
SODIUM SERPL-SCNC: 139 MMOL/L (ref 136–145)
WBC # BLD AUTO: 4.8 K/UL (ref 4–11)

## 2024-09-13 ENCOUNTER — TELEPHONE (OUTPATIENT)
Dept: PRIMARY CARE CLINIC | Age: 39
End: 2024-09-13

## 2024-11-13 NOTE — PROGRESS NOTES
[facility]      CONSULTATION  [phone]  12/5/24  Referring: Christi Jones, ELAYNE - CNP (PCP)    REASON FOR CONSULT/CHIEF COMPLAINT/HPI     Reason for visit/ Chief complaint  PVCs / nonsustained VT   HPI Ana Maria Peralta is a 38 y.o.  female patient here for follow up.     In 2020 she had mild palpitations that were minimally symptomatic, but had an EKG showing frequent PVCs.  Questionable history of syncope (which patient denies to me).  24-H Holter in 2020 showed 7% PVCs and 1 short run of NSVT.  She was started on metoprolol, which she didn't tolerate well due to hypotension, and PVC burden remained 8%.  MRI done and negative for ARVC.  Echo normal.  No worrisome FH of sudden cardiac death.     She has been on flecainide for a while but stopped it recently.  At my last visit with her I discussed her case with Dr. Rooney (EP) and determined that we could stop her flecainide safely with close monitoring.    Today she is doing well. She had her echo today prior to our visit and it was normal. She states that she is still smoking, but has stopped drinking beer. She denies any CP, SOB, dizziness at this time. She rarely feels palpitations.     Patient is adherent with medications and is tolerating them well without side effects     HISTORY/ALLERGIES/ROS     MedHx:  has a past medical history of Abnormal Pap smear, Anemia, Cholelithiasis, and Migraines.  SurgHx:  has a past surgical history that includes [place] tooth extraction; Cholecystectomy, laparoscopic (1/12/12); and Hysterectomy, vaginal (05/2015).   SocHx:  reports that she has been smoking cigarettes. She has never used smokeless tobacco. She reports current alcohol use. She reports that she does not use drugs.   FamHx: No family history of premature coronary artery disease, sudden death, or aneurysm  Family History   Problem Relation Age of Onset    Miscarriages / Stillbirths Mother     Arthritis Father     High Cholesterol Father

## 2024-11-21 PROBLEM — I47.29 NSVT (NONSUSTAINED VENTRICULAR TACHYCARDIA) (HCC): Status: ACTIVE | Noted: 2024-11-21

## 2024-12-05 ENCOUNTER — OFFICE VISIT (OUTPATIENT)
Dept: CARDIOLOGY CLINIC | Age: 39
End: 2024-12-05

## 2024-12-05 VITALS
OXYGEN SATURATION: 98 % | HEART RATE: 80 BPM | WEIGHT: 123 LBS | HEIGHT: 67 IN | DIASTOLIC BLOOD PRESSURE: 60 MMHG | BODY MASS INDEX: 19.3 KG/M2 | SYSTOLIC BLOOD PRESSURE: 90 MMHG

## 2024-12-05 DIAGNOSIS — I49.3 PVC (PREMATURE VENTRICULAR CONTRACTION): ICD-10-CM

## 2024-12-05 DIAGNOSIS — I47.29 NSVT (NONSUSTAINED VENTRICULAR TACHYCARDIA) (HCC): ICD-10-CM

## 2024-12-05 DIAGNOSIS — R00.2 PALPITATIONS: Primary | ICD-10-CM

## 2024-12-05 DIAGNOSIS — Z72.0 TOBACCO ABUSE: ICD-10-CM

## 2025-07-02 ENCOUNTER — PATIENT MESSAGE (OUTPATIENT)
Dept: PRIMARY CARE CLINIC | Age: 40
End: 2025-07-02

## 2025-07-02 DIAGNOSIS — Z13.21 ENCOUNTER FOR VITAMIN DEFICIENCY SCREENING: ICD-10-CM

## 2025-07-02 DIAGNOSIS — Z13.29 THYROID DISORDER SCREENING: ICD-10-CM

## 2025-07-02 DIAGNOSIS — Z00.00 PERIODIC HEALTH ASSESSMENT, GENERAL SCREENING, ADULT: ICD-10-CM

## 2025-07-02 DIAGNOSIS — Z13.0 SCREENING FOR DEFICIENCY ANEMIA: ICD-10-CM

## 2025-07-02 DIAGNOSIS — Z13.1 DIABETES MELLITUS SCREENING: Primary | ICD-10-CM

## 2025-07-02 DIAGNOSIS — Z13.220 LIPID SCREENING: ICD-10-CM

## 2025-07-02 NOTE — TELEPHONE ENCOUNTER
1. Diabetes mellitus screening  - Hemoglobin A1C; Future    2. Lipid screening  - Lipid, Fasting; Future    3. Encounter for vitamin deficiency screening  - Vitamin D 25 Hydroxy; Future    4. Screening for deficiency anemia  - CBC with Auto Differential; Future    5. Periodic health assessment, general screening, adult  - Comprehensive Metabolic Panel, Fasting; Future    6. Thyroid disorder screening  - TSH reflex to FT4, FT3; Future

## 2025-07-03 DIAGNOSIS — Z13.21 ENCOUNTER FOR VITAMIN DEFICIENCY SCREENING: ICD-10-CM

## 2025-07-03 DIAGNOSIS — Z13.0 SCREENING FOR DEFICIENCY ANEMIA: ICD-10-CM

## 2025-07-03 DIAGNOSIS — Z00.00 PERIODIC HEALTH ASSESSMENT, GENERAL SCREENING, ADULT: ICD-10-CM

## 2025-07-03 DIAGNOSIS — Z13.1 DIABETES MELLITUS SCREENING: ICD-10-CM

## 2025-07-03 DIAGNOSIS — Z13.29 THYROID DISORDER SCREENING: ICD-10-CM

## 2025-07-03 DIAGNOSIS — Z13.220 LIPID SCREENING: ICD-10-CM

## 2025-07-03 LAB
25(OH)D3 SERPL-MCNC: 44 NG/ML
ALBUMIN SERPL-MCNC: 4.5 G/DL (ref 3.4–5)
ALBUMIN/GLOB SERPL: 2 {RATIO} (ref 1.1–2.2)
ALP SERPL-CCNC: 64 U/L (ref 40–129)
ALT SERPL-CCNC: 15 U/L (ref 10–40)
ANION GAP SERPL CALCULATED.3IONS-SCNC: 10 MMOL/L (ref 3–16)
AST SERPL-CCNC: 19 U/L (ref 15–37)
BASOPHILS # BLD: 0 K/UL (ref 0–0.2)
BASOPHILS NFR BLD: 1.1 %
BILIRUB SERPL-MCNC: 0.7 MG/DL (ref 0–1)
BUN SERPL-MCNC: 13 MG/DL (ref 7–20)
CALCIUM SERPL-MCNC: 9.5 MG/DL (ref 8.3–10.6)
CHLORIDE SERPL-SCNC: 103 MMOL/L (ref 99–110)
CHOLEST SERPL-MCNC: 161 MG/DL (ref 0–199)
CO2 SERPL-SCNC: 27 MMOL/L (ref 21–32)
CREAT SERPL-MCNC: 0.8 MG/DL (ref 0.6–1.1)
DEPRECATED RDW RBC AUTO: 12.4 % (ref 12.4–15.4)
EOSINOPHIL # BLD: 0.4 K/UL (ref 0–0.6)
EOSINOPHIL NFR BLD: 9.6 %
EST. AVERAGE GLUCOSE BLD GHB EST-MCNC: 91.1 MG/DL
GFR SERPLBLD CREATININE-BSD FMLA CKD-EPI: >90 ML/MIN/{1.73_M2}
GLUCOSE P FAST SERPL-MCNC: 88 MG/DL (ref 70–99)
HBA1C MFR BLD: 4.8 %
HCT VFR BLD AUTO: 39.6 % (ref 36–48)
HDLC SERPL-MCNC: 50 MG/DL (ref 40–60)
HGB BLD-MCNC: 13.8 G/DL (ref 12–16)
LDL CHOLESTEROL: 94 MG/DL
LYMPHOCYTES # BLD: 1.7 K/UL (ref 1–5.1)
LYMPHOCYTES NFR BLD: 36.9 %
MCH RBC QN AUTO: 33.2 PG (ref 26–34)
MCHC RBC AUTO-ENTMCNC: 34.9 G/DL (ref 31–36)
MCV RBC AUTO: 95.2 FL (ref 80–100)
MONOCYTES # BLD: 0.3 K/UL (ref 0–1.3)
MONOCYTES NFR BLD: 7 %
NEUTROPHILS # BLD: 2.1 K/UL (ref 1.7–7.7)
NEUTROPHILS NFR BLD: 45.4 %
PLATELET # BLD AUTO: 200 K/UL (ref 135–450)
PMV BLD AUTO: 10.8 FL (ref 5–10.5)
POTASSIUM SERPL-SCNC: 4.2 MMOL/L (ref 3.5–5.1)
PROT SERPL-MCNC: 6.8 G/DL (ref 6.4–8.2)
RBC # BLD AUTO: 4.16 M/UL (ref 4–5.2)
SODIUM SERPL-SCNC: 140 MMOL/L (ref 136–145)
TRIGL SERPL-MCNC: 85 MG/DL (ref 0–150)
TSH SERPL DL<=0.005 MIU/L-ACNC: 2.81 UIU/ML (ref 0.27–4.2)
VLDLC SERPL CALC-MCNC: 17 MG/DL
WBC # BLD AUTO: 4.5 K/UL (ref 4–11)

## 2025-08-29 ENCOUNTER — OFFICE VISIT (OUTPATIENT)
Dept: PRIMARY CARE CLINIC | Age: 40
End: 2025-08-29
Payer: COMMERCIAL

## 2025-08-29 VITALS
WEIGHT: 111 LBS | SYSTOLIC BLOOD PRESSURE: 92 MMHG | RESPIRATION RATE: 16 BRPM | DIASTOLIC BLOOD PRESSURE: 60 MMHG | HEART RATE: 73 BPM | TEMPERATURE: 98 F | BODY MASS INDEX: 17.42 KG/M2 | HEIGHT: 67 IN | OXYGEN SATURATION: 98 %

## 2025-08-29 DIAGNOSIS — Z00.00 ANNUAL PHYSICAL EXAM: Primary | ICD-10-CM

## 2025-08-29 PROCEDURE — 99395 PREV VISIT EST AGE 18-39: CPT | Performed by: NURSE PRACTITIONER

## 2025-08-29 SDOH — ECONOMIC STABILITY: FOOD INSECURITY: WITHIN THE PAST 12 MONTHS, YOU WORRIED THAT YOUR FOOD WOULD RUN OUT BEFORE YOU GOT MONEY TO BUY MORE.: NEVER TRUE

## 2025-08-29 SDOH — ECONOMIC STABILITY: FOOD INSECURITY: WITHIN THE PAST 12 MONTHS, THE FOOD YOU BOUGHT JUST DIDN'T LAST AND YOU DIDN'T HAVE MONEY TO GET MORE.: NEVER TRUE

## 2025-08-29 ASSESSMENT — ENCOUNTER SYMPTOMS
SHORTNESS OF BREATH: 0
CONSTIPATION: 0
DIARRHEA: 0
CHEST TIGHTNESS: 0

## 2025-08-29 ASSESSMENT — PATIENT HEALTH QUESTIONNAIRE - PHQ9
1. LITTLE INTEREST OR PLEASURE IN DOING THINGS: NOT AT ALL
SUM OF ALL RESPONSES TO PHQ QUESTIONS 1-9: 0
2. FEELING DOWN, DEPRESSED OR HOPELESS: NOT AT ALL
SUM OF ALL RESPONSES TO PHQ QUESTIONS 1-9: 0